# Patient Record
Sex: FEMALE
[De-identification: names, ages, dates, MRNs, and addresses within clinical notes are randomized per-mention and may not be internally consistent; named-entity substitution may affect disease eponyms.]

---

## 2020-05-26 PROBLEM — Z00.00 ENCOUNTER FOR PREVENTIVE HEALTH EXAMINATION: Status: ACTIVE | Noted: 2020-05-26

## 2020-05-27 ENCOUNTER — LABORATORY RESULT (OUTPATIENT)
Age: 65
End: 2020-05-27

## 2020-05-27 ENCOUNTER — APPOINTMENT (OUTPATIENT)
Dept: CARDIOLOGY | Facility: CLINIC | Age: 65
End: 2020-05-27
Payer: MEDICARE

## 2020-05-27 ENCOUNTER — NON-APPOINTMENT (OUTPATIENT)
Age: 65
End: 2020-05-27

## 2020-05-27 VITALS
BODY MASS INDEX: 31.65 KG/M2 | WEIGHT: 190 LBS | DIASTOLIC BLOOD PRESSURE: 85 MMHG | SYSTOLIC BLOOD PRESSURE: 136 MMHG | RESPIRATION RATE: 15 BRPM | HEART RATE: 94 BPM | HEIGHT: 65 IN

## 2020-05-27 PROCEDURE — 99204 OFFICE O/P NEW MOD 45 MIN: CPT

## 2020-05-27 PROCEDURE — 93000 ELECTROCARDIOGRAM COMPLETE: CPT

## 2020-05-27 NOTE — REASON FOR VISIT
[Consultation] : a consultation regarding [Chest Pain] : chest pain [Palpitations] : palpitations [FreeTextEntry1] : murmur

## 2020-05-27 NOTE — DISCUSSION/SUMMARY
[FreeTextEntry1] : This is a 65-year-old female with past medical history significant for status post partial thyroidectomy, (goiter), status post appendectomy, status post 2  sections, who comes in for cardiac consultation.  The patient has been complaining of a one-year history of episodic chest pressure.  She also complains of occasional palpitations best described as a rapid heartbeat usually when she has a lot of stress.\par She was born in [facility] and has no history of rheumatic fever.  She does not drink excessive caffeine or alcohol.  She lost her  9 years ago to liver cancer (had a history of hepatitis).  Her brother had a heart condition and ultimately  of sepsis.  She was never a smoker.  Her blood pressure is slightly elevated today but she reports it is usually normal.\par Electrocardiogram done May 27, 2020 demonstrated normal sinus rhythm at a rate of 99 bpm is otherwise remarkable for left atrial abnormality.  There are T wave inversions in V1 and V2 which could represent juvenile T wave pattern.\par The patient is also concerned about COVID-19.  She traveled from [facility] through [place] back to the [place] 2020.  She will have blood work today for COVID-19 antibodies.  I think her palpitations currently are related to anxiety.\par The patient will schedule exercise stress test to rule out significant coronary artery disease.\par She will schedule an echo Doppler examination to evaluate her murmur, palpitations, evaluate her left ventricular function, chamber size, murmur, and rule out hypertrophy.\par She is encouraged to use Pepcid AC max as needed and maintain good hydration.\par \par She will follow-up with me after above-noted diagnostic tests are completed.  She will follow-up with her medical doctor as well.  Complete blood work was done today for lipid panel and SMA-20.\par The patient understands that aerobic exercises must be increased to 40 minutes 4 times per week. A detailed discussion of lifestyle modification was done today. The patient has a good understanding of the diagnosis, and treatment plan. Lifestyle modification was also outlined.

## 2020-05-27 NOTE — PHYSICAL EXAM
[Eyelids - No Xanthelasma] : the eyelids demonstrated no xanthelasmas [5th Left ICS - MCL] : palpated at the 5th LICS in the midclavicular line [I] : a grade 1 [No Xanthoma] : no  xanthoma was observed [General Appearance - Well Developed] : well developed [Normal Appearance] : normal appearance [Well Groomed] : well groomed [No Deformities] : no deformities [General Appearance - In No Acute Distress] : no acute distress [General Appearance - Well Nourished] : well nourished [Normal Conjunctiva] : the conjunctiva exhibited no abnormalities [Normal Oral Mucosa] : normal oral mucosa [Normal Oropharynx] : normal oropharynx [Normal Jugular Venous A Waves Present] : normal jugular venous A waves present [Normal Jugular Venous V Waves Present] : normal jugular venous V waves present [No Jugular Venous Oquendo A Waves] : no jugular venous oquendo A waves [Normal] : normal [No Precordial Heave] : no precordial heave was noted [Normal Rate] : normal [Rhythm Regular] : regular [Normal S1] : normal S1 [Normal S2] : normal S2 [No Gallop] : no gallop heard [II] : a grade 2 [2+] : right 2+ [No Pitting Edema] : no pitting edema present [No Abnormalities] : the abdominal aorta was not enlarged and no bruit was heard [Respiration, Rhythm And Depth] : normal respiratory rhythm and effort [Exaggerated Use Of Accessory Muscles For Inspiration] : no accessory muscle use [Auscultation Breath Sounds / Voice Sounds] : lungs were clear to auscultation bilaterally [Bowel Sounds] : normal bowel sounds [Abdomen Soft] : soft [Gait - Sufficient For Exercise Testing] : the gait was sufficient for exercise testing [Abnormal Walk] : normal gait [Nail Clubbing] : no clubbing of the fingernails [Cyanosis, Localized] : no localized cyanosis [Skin Color & Pigmentation] : normal skin color and pigmentation [Skin Turgor] : normal skin turgor [Oriented To Time, Place, And Person] : oriented to person, place, and time [] : no rash [Affect] : the affect was normal [No Anxiety] : not feeling anxious [Mood] : the mood was normal [Click] : no click [S4] : no S4 [S3] : no S3 [Distant] : the heart sounds were ~L not distant [Pericardial Rub] : no pericardial rub [Right Carotid Bruit] : no bruit heard over the right carotid [Left Carotid Bruit] : no bruit heard over the left carotid [Left Femoral Bruit] : no bruit heard over the left femoral artery [Right Femoral Bruit] : no bruit heard over the right femoral artery

## 2020-05-27 NOTE — DISCUSSION/SUMMARY
[FreeTextEntry1] : This is a 65-year-old female with past medical history significant for status post partial thyroidectomy, (goiter), status post appendectomy, status post 2  sections, who comes in for cardiac consultation.  The patient has been complaining of a one-year history of episodic chest pressure.  She also complains of occasional palpitations best described as a rapid heartbeat usually when she has a lot of stress.\par She was born in [facility] and has no history of rheumatic fever.  She does not drink excessive caffeine or alcohol.  She lost her  9 years ago to liver cancer (had a history of hepatitis).  Her brother had a heart condition and ultimately  of sepsis.  She was never a smoker.  Her blood pressure is slightly elevated today but she reports it is usually normal.\par Electrocardiogram done May 27, 2020 demonstrated normal sinus rhythm at a rate of 99 bpm is otherwise remarkable for left atrial abnormality.  There are T wave inversions in V1 and V2 which could represent juvenile T wave pattern.\par The patient is also concerned about COVID-19.  She traveled from [facility] through [place] back to the [place] 2020.  She will have blood work today for COVID-19 antibodies.  I think her palpitations currently are related to anxiety.\par The patient will schedule exercise stress test to rule out significant coronary artery disease.\par She will schedule an echo Doppler examination to evaluate her murmur, palpitations, evaluate her left ventricular function, chamber size, murmur, and rule out hypertrophy.\par She is encouraged to use Pepcid AC max as needed and maintain good hydration.\par \par She will follow-up with me after above-noted diagnostic tests are completed.  She will follow-up with her medical doctor as well.  Complete blood work was done today for lipid panel and SMA-20.\par The patient understands that aerobic exercises must be increased to 40 minutes 4 times per week. A detailed discussion of lifestyle modification was done today. The patient has a good understanding of the diagnosis, and treatment plan. Lifestyle modification was also outlined.

## 2020-05-27 NOTE — PHYSICAL EXAM
[Eyelids - No Xanthelasma] : the eyelids demonstrated no xanthelasmas [5th Left ICS - MCL] : palpated at the 5th LICS in the midclavicular line [I] : a grade 1 [No Xanthoma] : no  xanthoma was observed [General Appearance - Well Developed] : well developed [Well Groomed] : well groomed [Normal Appearance] : normal appearance [General Appearance - Well Nourished] : well nourished [No Deformities] : no deformities [General Appearance - In No Acute Distress] : no acute distress [Normal Oral Mucosa] : normal oral mucosa [Normal Conjunctiva] : the conjunctiva exhibited no abnormalities [Normal Jugular Venous A Waves Present] : normal jugular venous A waves present [Normal Oropharynx] : normal oropharynx [Normal Jugular Venous V Waves Present] : normal jugular venous V waves present [No Jugular Venous Oquendo A Waves] : no jugular venous oquendo A waves [No Precordial Heave] : no precordial heave was noted [Normal] : normal [Rhythm Regular] : regular [Normal S1] : normal S1 [Normal Rate] : normal [No Gallop] : no gallop heard [Normal S2] : normal S2 [II] : a grade 2 [2+] : right 2+ [No Pitting Edema] : no pitting edema present [No Abnormalities] : the abdominal aorta was not enlarged and no bruit was heard [Respiration, Rhythm And Depth] : normal respiratory rhythm and effort [Auscultation Breath Sounds / Voice Sounds] : lungs were clear to auscultation bilaterally [Exaggerated Use Of Accessory Muscles For Inspiration] : no accessory muscle use [Bowel Sounds] : normal bowel sounds [Abdomen Soft] : soft [Abnormal Walk] : normal gait [Nail Clubbing] : no clubbing of the fingernails [Gait - Sufficient For Exercise Testing] : the gait was sufficient for exercise testing [Skin Color & Pigmentation] : normal skin color and pigmentation [Cyanosis, Localized] : no localized cyanosis [Skin Turgor] : normal skin turgor [Oriented To Time, Place, And Person] : oriented to person, place, and time [] : no rash [Affect] : the affect was normal [Mood] : the mood was normal [No Anxiety] : not feeling anxious [S4] : no S4 [Click] : no click [S3] : no S3 [Distant] : the heart sounds were ~L not distant [Pericardial Rub] : no pericardial rub [Left Carotid Bruit] : no bruit heard over the left carotid [Right Carotid Bruit] : no bruit heard over the right carotid [Left Femoral Bruit] : no bruit heard over the left femoral artery [Right Femoral Bruit] : no bruit heard over the right femoral artery

## 2020-06-09 ENCOUNTER — APPOINTMENT (OUTPATIENT)
Dept: CARDIOLOGY | Facility: CLINIC | Age: 65
End: 2020-06-09

## 2020-08-26 ENCOUNTER — RESULT REVIEW (OUTPATIENT)
Age: 65
End: 2020-08-26

## 2021-03-01 ENCOUNTER — APPOINTMENT (OUTPATIENT)
Dept: CARDIOLOGY | Facility: CLINIC | Age: 66
End: 2021-03-01
Payer: MEDICARE

## 2021-03-01 ENCOUNTER — NON-APPOINTMENT (OUTPATIENT)
Age: 66
End: 2021-03-01

## 2021-03-01 VITALS
WEIGHT: 200 LBS | DIASTOLIC BLOOD PRESSURE: 90 MMHG | HEIGHT: 65 IN | HEART RATE: 85 BPM | SYSTOLIC BLOOD PRESSURE: 134 MMHG | BODY MASS INDEX: 33.32 KG/M2 | RESPIRATION RATE: 16 BRPM

## 2021-03-01 PROCEDURE — 99072 ADDL SUPL MATRL&STAF TM PHE: CPT

## 2021-03-01 PROCEDURE — 93000 ELECTROCARDIOGRAM COMPLETE: CPT | Mod: 59

## 2021-03-01 PROCEDURE — 93224 XTRNL ECG REC UP TO 48 HRS: CPT

## 2021-03-01 PROCEDURE — 99214 OFFICE O/P EST MOD 30 MIN: CPT

## 2021-03-01 NOTE — PHYSICAL EXAM
[General Appearance - Well Developed] : well developed [Normal Appearance] : normal appearance [Well Groomed] : well groomed [General Appearance - Well Nourished] : well nourished [No Deformities] : no deformities [General Appearance - In No Acute Distress] : no acute distress [Normal Conjunctiva] : the conjunctiva exhibited no abnormalities [Normal Oral Mucosa] : normal oral mucosa [Normal Oropharynx] : normal oropharynx [Normal Jugular Venous A Waves Present] : normal jugular venous A waves present [Normal Jugular Venous V Waves Present] : normal jugular venous V waves present [No Jugular Venous Oquendo A Waves] : no jugular venous oquendo A waves [Respiration, Rhythm And Depth] : normal respiratory rhythm and effort [Exaggerated Use Of Accessory Muscles For Inspiration] : no accessory muscle use [Auscultation Breath Sounds / Voice Sounds] : lungs were clear to auscultation bilaterally [Bowel Sounds] : normal bowel sounds [Abdomen Soft] : soft [Abnormal Walk] : normal gait [Gait - Sufficient For Exercise Testing] : the gait was sufficient for exercise testing [Nail Clubbing] : no clubbing of the fingernails [Cyanosis, Localized] : no localized cyanosis [Skin Color & Pigmentation] : normal skin color and pigmentation [Skin Turgor] : normal skin turgor [] : no rash [Oriented To Time, Place, And Person] : oriented to person, place, and time [Affect] : the affect was normal [Mood] : the mood was normal [No Anxiety] : not feeling anxious [5th Left ICS - MCL] : palpated at the 5th LICS in the midclavicular line [Normal] : normal [No Precordial Heave] : no precordial heave was noted [Normal Rate] : normal [Rhythm Regular] : regular [Normal S1] : normal S1 [Normal S2] : normal S2 [No Gallop] : no gallop heard [II] : a grade 2 [2+] : left 2+ [No Abnormalities] : the abdominal aorta was not enlarged and no bruit was heard [No Pitting Edema] : no pitting edema present [S3] : no S3 [S4] : no S4 [Click] : no click [Pericardial Rub] : no pericardial rub [Right Carotid Bruit] : no bruit heard over the right carotid [Left Carotid Bruit] : no bruit heard over the left carotid [Right Femoral Bruit] : no bruit heard over the right femoral artery [Left Femoral Bruit] : no bruit heard over the left femoral artery

## 2021-03-01 NOTE — ASSESSMENT
[FreeTextEntry1] : This is a 65 year year old female here today for follow up cardiac evaluation. \par She has a past medical history significant for status post partial thyroidectomy, (goiter), status post appendectomy, status post 2  sections and palpitations.\par \par Today she is stating that she is having occasional LOFTON and chest pain when she feels that she is having an "anxiety attack". She states that she will feel her heart racing at that when she puts ice cubes on her neck she would feel a little better. She states that she has been under a lot of stress lately with the stress of COVID and not being able to visit her home in [place]. She also lives with her elderly mother and is worried about her health. She states that when she feels nervous her panic attacks can last for up to 1 hour and can happen daily. She did take 1/2 a xanax last night when made her feel better but she states that she was not able to sleep last night. She was last seen here in May 2020.\par \par BLOOD PRESSURE:\par -BP is borderline elevated on today's exam and she states that she does not want to start BP medication at this time. I explained that we will recheck her BP in 1 month and if it remains elevated I would consider starting Telmisartan 40mg PO DAILY. \par \par -I have discussed the importance of maintaining good BP control and reviewed the newest guidelines with the patient while re-enforcing dietary sodium restrictions to no more than 2-3 g daily, DASH diet, life style modifications as well as the goal of maintaining ideal body weight with the patient today. I have advised the patient to avoid the use of over-the-counter medications/ supplements especially NSAIDS.\par \par BLOOD WORK:\par -New blood work was done today, 2021 to evaluate lipid profile, CBC, BMP, hepatic function, A1C and TSH. Pt currently taking Rosuvastatin 20mg PO DAILY.\par \par CHOLESTEROL CONTROL:\par -Patient will continue the advised  TLC diet and to continue follow-up for treatment of hyperlipidemia and repeat blood testing with diet and exercise. I have discussed different exercises and the importance of maintenance of optimal body weight. The importance of staying within guidelines and recommendations was stressed to the patient today and they acknowledged that they understand this to me verbally.\par \par  -Ms. GILL was educated and advised that failure to follow-up with my medical recommendations to lower cholesterol can result in severe health consequences therefore, they will continuing a low saturated and low fat diet and to avoid excessive carbohydrates to help reduce triglycerides and that lowering LDL levels is associated with a significant decrease in serious cardiac events including but not limited to heart attack stroke and overall death. We will continue lipid lowering agents as advised based on blood test results and the patient understands to call if they develop severe muscle discomfort or if they have a reddish tinted discoloration in their urine.\par \par TESTING/REPORTS:\par -EKG done Mar 01, 2021 which demonstrated regular sinus rhythm with nonspecific ST-T wave changes, BPM of 85.\par \par PLAN:\par -The patient will schedule an Exercise Stress Test rule out significant coronary artery disease and will schedule \par an Echo Doppler examination to evaluate murmur, left ventricular function, chamber size, and rule out hypertrophy.\par -We will order 24 hour holter monitor to evaluate her palpitations. \par -She will stay well hydrated. \par -I will provide her with a PCP Dr. Phelan since she does not have one right now. \par -We will reassess her BP and if it remains elevated then we will discuss the addition of Telmisartan 40mg PO DAILY.\par \par I have discussed the plan of care with Ms. ZAN GILL  and she  will follow up in 1 month. She is compliant with all of her medications.\par \par The patient understands that aerobic exercises must be increased to minutes 4 times/week and a detailed discussion of lifestyle modification was done today. \par The patient has a good understanding of the diagnosis, treatment plan and lifestyle modification. \par She will contact me at the office for any questions with their care or any changes in their health status.\par \par The plan of care was discussed with supervising physician, Dr. Morse while present in the office at the time of the visit. \par \par Raoul WEBB

## 2021-03-01 NOTE — DISCUSSION/SUMMARY
[FreeTextEntry1] : Dr. Morse-(PRIOR VISIT and PMH WITH Dr. Morse): \par This is a 65-year-old female with past medical history significant for status post partial thyroidectomy, (goiter), status post appendectomy, status post 2  sections, who comes in for cardiac consultation.  The patient has been complaining of a one-year history of episodic chest pressure.  She also complains of occasional palpitations best described as a rapid heartbeat usually when she has a lot of stress.\par She was born in [facility] and has no history of rheumatic fever.  She does not drink excessive caffeine or alcohol.  She lost her  9 years ago to liver cancer (had a history of hepatitis).  Her brother had a heart condition and ultimately  of sepsis.  She was never a smoker.  Her blood pressure is slightly elevated today but she reports it is usually normal.\par Electrocardiogram done May 27, 2020 demonstrated normal sinus rhythm at a rate of 99 bpm is otherwise remarkable for left atrial abnormality.  There are T wave inversions in V1 and V2 which could represent juvenile T wave pattern.\par The patient is also concerned about COVID-19.  She traveled from [facility] through [place] back [place] the [place] 2020.  She will have blood work today for COVID-19 antibodies.  I think her palpitations currently are related to anxiety.\par The patient will schedule exercise stress test to rule out significant coronary artery disease.\par She will schedule an echo Doppler examination to evaluate her murmur, palpitations, evaluate her left ventricular function, chamber size, murmur, and rule out hypertrophy.\par She is encouraged to use Pepcid AC max as needed and maintain good hydration.\par \par She will follow-up with me after above-noted diagnostic tests are completed.  She will follow-up with her medical doctor as well.  Complete blood work was done today for lipid panel and SMA-20.\par The patient understands that aerobic exercises must be increased to 40 minutes 4 times per week. A detailed discussion of lifestyle modification was done today. The patient has a good understanding of the diagnosis, and treatment plan. Lifestyle modification was also outlined.

## 2021-03-01 NOTE — REASON FOR VISIT
[Follow-Up - Clinic] : a clinic follow-up of [Chest Pain] : chest pain [Hyperlipidemia] : hyperlipidemia [Palpitations] : palpitations [Hypertension] : hypertension [FreeTextEntry1] : This is a 65 year year old female here today for follow up cardiac evaluation. \par She has a past medical history significant for status post partial thyroidectomy, (goiter), status post appendectomy, status post 2  sections and palpitations.\par \par Today she is stating that she is having occasional LOFTON and chest pain when she feels that she is having an "anxiety attack". She states that she will feel her heart racing at that when she puts ice cubes on her neck she would feel a little better. She states that she has been under a lot of stress lately with the stress of COVID and not being able to visit her home in [place]. She also lives with her elderly mother and is worried about her health. She states that when she feels nervous her panic attacks can last for up to 1 hour and can happen daily. She did take 1/2 a xanax last night when made her feel better but she states that she was not able to sleep last night. She was last seen here in May 2020.\par \par

## 2021-03-09 ENCOUNTER — NON-APPOINTMENT (OUTPATIENT)
Age: 66
End: 2021-03-09

## 2021-03-16 ENCOUNTER — TRANSCRIPTION ENCOUNTER (OUTPATIENT)
Age: 66
End: 2021-03-16

## 2021-03-19 ENCOUNTER — NON-APPOINTMENT (OUTPATIENT)
Age: 66
End: 2021-03-19

## 2021-03-23 ENCOUNTER — NON-APPOINTMENT (OUTPATIENT)
Age: 66
End: 2021-03-23

## 2021-04-29 ENCOUNTER — NON-APPOINTMENT (OUTPATIENT)
Age: 66
End: 2021-04-29

## 2021-04-30 ENCOUNTER — APPOINTMENT (OUTPATIENT)
Dept: CARDIOLOGY | Facility: CLINIC | Age: 66
End: 2021-04-30
Payer: MEDICARE

## 2021-04-30 ENCOUNTER — NON-APPOINTMENT (OUTPATIENT)
Age: 66
End: 2021-04-30

## 2021-04-30 VITALS
OXYGEN SATURATION: 98 % | SYSTOLIC BLOOD PRESSURE: 138 MMHG | BODY MASS INDEX: 27.32 KG/M2 | TEMPERATURE: 97.8 F | WEIGHT: 164 LBS | DIASTOLIC BLOOD PRESSURE: 84 MMHG | RESPIRATION RATE: 16 BRPM | HEART RATE: 97 BPM | HEIGHT: 65 IN

## 2021-04-30 DIAGNOSIS — Z87.898 PERSONAL HISTORY OF OTHER SPECIFIED CONDITIONS: ICD-10-CM

## 2021-04-30 PROCEDURE — 93246 EXT ECG>7D<15D RECORDING: CPT

## 2021-04-30 PROCEDURE — 93000 ELECTROCARDIOGRAM COMPLETE: CPT | Mod: 59

## 2021-04-30 PROCEDURE — 99214 OFFICE O/P EST MOD 30 MIN: CPT

## 2021-04-30 PROCEDURE — 99072 ADDL SUPL MATRL&STAF TM PHE: CPT

## 2021-04-30 NOTE — PHYSICAL EXAM
[Well Nourished] : well nourished [Well Developed] : well developed [No Acute Distress] : no acute distress [Normal Venous Pressure] : normal venous pressure [No Carotid Bruit] : no carotid bruit [Normal S1, S2] : normal S1, S2 [No Murmur] : no murmur [No Rub] : no rub [Clear Lung Fields] : clear lung fields [Good Air Entry] : good air entry [No Respiratory Distress] : no respiratory distress  [Soft] : abdomen soft [Non Tender] : non-tender [No Masses/organomegaly] : no masses/organomegaly [Normal Bowel Sounds] : normal bowel sounds [Normal Gait] : normal gait [No Edema] : no edema [No Cyanosis] : no cyanosis [No Clubbing] : no clubbing [No Varicosities] : no varicosities [No Rash] : no rash [No Skin Lesions] : no skin lesions [Moves all extremities] : moves all extremities [No Focal Deficits] : no focal deficits [Normal Speech] : normal speech [Alert and Oriented] : alert and oriented [Normal memory] : normal memory [General Appearance - Well Developed] : well developed [Normal Appearance] : normal appearance [Well Groomed] : well groomed [General Appearance - Well Nourished] : well nourished [No Deformities] : no deformities [General Appearance - In No Acute Distress] : no acute distress [Normal Conjunctiva] : the conjunctiva exhibited no abnormalities [Normal Oral Mucosa] : normal oral mucosa [Normal Oropharynx] : normal oropharynx [Normal Jugular Venous A Waves Present] : normal jugular venous A waves present [Normal Jugular Venous V Waves Present] : normal jugular venous V waves present [No Jugular Venous Oquendo A Waves] : no jugular venous oqeundo A waves [Respiration, Rhythm And Depth] : normal respiratory rhythm and effort [Exaggerated Use Of Accessory Muscles For Inspiration] : no accessory muscle use [Auscultation Breath Sounds / Voice Sounds] : lungs were clear to auscultation bilaterally [Bowel Sounds] : normal bowel sounds [Abdomen Soft] : soft [Abnormal Walk] : normal gait [Gait - Sufficient For Exercise Testing] : the gait was sufficient for exercise testing [Nail Clubbing] : no clubbing of the fingernails [Cyanosis, Localized] : no localized cyanosis [Skin Color & Pigmentation] : normal skin color and pigmentation [Skin Turgor] : normal skin turgor [] : no rash [Oriented To Time, Place, And Person] : oriented to person, place, and time [Affect] : the affect was normal [Mood] : the mood was normal [No Anxiety] : not feeling anxious [5th Left ICS - MCL] : palpated at the 5th LICS in the midclavicular line [Normal] : normal [No Precordial Heave] : no precordial heave was noted [Normal Rate] : normal [Rhythm Regular] : regular [Normal S1] : normal S1 [Normal S2] : normal S2 [No Gallop] : no gallop heard [II] : a grade 2 [2+] : left 2+ [No Abnormalities] : the abdominal aorta was not enlarged and no bruit was heard [No Pitting Edema] : no pitting edema present [S3] : no S3 [S4] : no S4 [Click] : no click [Pericardial Rub] : no pericardial rub [Right Carotid Bruit] : no bruit heard over the right carotid [Left Carotid Bruit] : no bruit heard over the left carotid [Right Femoral Bruit] : no bruit heard over the right femoral artery [Left Femoral Bruit] : no bruit heard over the left femoral artery

## 2021-04-30 NOTE — ASSESSMENT
[FreeTextEntry1] : This is a 65 year year old female here today for follow up cardiac evaluation. \par She has a past medical history significant for status post partial thyroidectomy, (goiter), status post appendectomy, status post 2  sections and palpitations.\par \par She is here today because she experienced heart palpitations yesterday while she was out shopping for [place] Easter which is this weekend. She does have a hx of feeling like this before and had a 24 hour holter monitor which was placed in 2021 which demonstrated NSR avg HR 84. She states that the palpitations lasted for about 1-2 hours and she put cold water on her neck and took 0.5 tab of a Xanax which did help her palpitations. When the episode occurred she did feel some chest pain/pressures with some LOFTON. She thinks she may be having anxiety attacks but would like to make sure it is not her heart since she  does have FM of heart disease with her father. \par \par BLOOD PRESSURE:\par -BP is borderline elevated on today's exam and she states that she does not want to start BP medication at this time. I explained that we will recheck her BP in 1 month and if it remains elevated I would consider starting Telmisartan 40mg PO DAILY.  She states that she has been very stressed and at home her BP is usually better.\par \par -I have discussed the importance of maintaining good BP control and reviewed the newest guidelines with the patient while re-enforcing dietary sodium restrictions to no more than 2-3 g daily, DASH diet, life style modifications as well as the goal of maintaining ideal body weight with the patient today. I have advised the patient to avoid the use of over-the-counter medications/ supplements especially NSAIDS.\par \par BLOOD WORK:\par -New blood work was done 2021 which demonstrated LDL of 101. Pt currently taking Rosuvastatin 20mg PO DAILY. TSH and CBC were normal. \par \par CHOLESTEROL CONTROL:\par -Patient will continue the advised  TLC diet and to continue follow-up for treatment of hyperlipidemia and repeat blood testing with diet and exercise. I have discussed different exercises and the importance of maintenance of optimal body weight. The importance of staying within guidelines and recommendations was stressed to the patient today and they acknowledged that they understand this to me verbally.\par \par  -Ms. GILL was educated and advised that failure to follow-up with my medical recommendations to lower cholesterol can result in severe health consequences therefore, they will continuing a low saturated and low fat diet and to avoid excessive carbohydrates to help reduce triglycerides and that lowering LDL levels is associated with a significant decrease in serious cardiac events including but not limited to heart attack stroke and overall death. We will continue lipid lowering agents as advised based on blood test results and the patient understands to call if they develop severe muscle discomfort or if they have a reddish tinted discoloration in their urine.\par \par TESTING/REPORTS:\par -EKG done 21 which demonstrated regular sinus rhythm with nonspecific ST-T wave changes, BPM of 92.\par \par PLAN:\par -The patient will schedule Nuclear Stress Test rule out significant coronary artery disease and will schedule \par an Echo Doppler examination to evaluate murmur, left ventricular function, chamber size, and rule out hypertrophy.\par \par -She will stay well hydrated. \par -She will follow up with her PCP.\par \par -We will reassess her BP and if it remains elevated then we will discuss the addition of Telmisartan 40mg PO DAILY.\par \par I have discussed the plan of care with Ms. ZAN GILL  and she  will follow up in 1 month. She is compliant with all of her medications.\par \par The patient understands that aerobic exercises must be increased to minutes 4 times/week and a detailed discussion of lifestyle modification was done today. \par The patient has a good understanding of the diagnosis, treatment plan and lifestyle modification. \par She will contact me at the office for any questions with their care or any changes in their health status.\par \par The plan of care was discussed with supervising physician, Dr. Morse while present in the office at the time of the visit. \par \par Raoul WEBB

## 2021-04-30 NOTE — REASON FOR VISIT
[Follow-Up - Clinic] : a clinic follow-up of [Hyperlipidemia] : hyperlipidemia [Chest Pain] : chest pain [Hypertension] : hypertension [Palpitations] : palpitations [FreeTextEntry1] : murmur

## 2021-04-30 NOTE — DISCUSSION/SUMMARY
[FreeTextEntry1] : Dr. Morse-(PRIOR VISIT and PMH WITH Dr. Morse): \par This is a 65-year-old female with past medical history significant for status post partial thyroidectomy, (goiter), status post appendectomy, status post 2  sections, who comes in for cardiac consultation.  The patient has been complaining of a one-year history of episodic chest pressure.  She also complains of occasional palpitations best described as a rapid heartbeat usually when she has a lot of stress.\par She was born in [facility] and has no history of rheumatic fever.  She does not drink excessive caffeine or alcohol.  She lost her  9 years ago to liver cancer (had a history of hepatitis).  Her brother had a heart condition and ultimately  of sepsis.  She was never a smoker.  Her blood pressure is slightly elevated today but she reports it is usually normal.\par Electrocardiogram done May 27, 2020 demonstrated normal sinus rhythm at a rate of 99 bpm is otherwise remarkable for left atrial abnormality.  There are T wave inversions in V1 and V2 which could represent juvenile T wave pattern.\par The patient is also concerned about COVID-19.  She traveled from [facility] through [place] back to the [place] 2020.  She will have blood work today for COVID-19 antibodies.  I think her palpitations currently are related to anxiety.\par The patient will schedule exercise stress test to rule out significant coronary artery disease.\par She will schedule an echo Doppler examination to evaluate her murmur, palpitations, evaluate her left ventricular function, chamber size, murmur, and rule out hypertrophy.\par She is encouraged to use Pepcid AC max as needed and maintain good hydration.\par \par She will follow-up with me after above-noted diagnostic tests are completed.  She will follow-up with her medical doctor as well.  Complete blood work was done today for lipid panel and SMA-20.\par The patient understands that aerobic exercises must be increased to 40 minutes 4 times per week. A detailed discussion of lifestyle modification was done today. The patient has a good understanding of the diagnosis, and treatment plan. Lifestyle modification was also outlined.

## 2021-04-30 NOTE — REVIEW OF SYSTEMS
[Palpitations] : palpitations [Negative] : Heme/Lymph [Dyspnea on exertion] : dyspnea during exertion

## 2021-05-12 ENCOUNTER — APPOINTMENT (OUTPATIENT)
Dept: CARDIOLOGY | Facility: CLINIC | Age: 66
End: 2021-05-12
Payer: MEDICARE

## 2021-05-12 PROCEDURE — 93015 CV STRESS TEST SUPVJ I&R: CPT

## 2021-05-12 PROCEDURE — A9500: CPT

## 2021-05-12 PROCEDURE — 99072 ADDL SUPL MATRL&STAF TM PHE: CPT

## 2021-05-12 PROCEDURE — 78452 HT MUSCLE IMAGE SPECT MULT: CPT

## 2021-05-14 ENCOUNTER — APPOINTMENT (OUTPATIENT)
Dept: FAMILY MEDICINE | Facility: CLINIC | Age: 66
End: 2021-05-14

## 2021-05-19 ENCOUNTER — TRANSCRIPTION ENCOUNTER (OUTPATIENT)
Age: 66
End: 2021-05-19

## 2021-05-20 ENCOUNTER — APPOINTMENT (OUTPATIENT)
Dept: CARDIOLOGY | Facility: CLINIC | Age: 66
End: 2021-05-20
Payer: MEDICARE

## 2021-05-20 PROCEDURE — 93306 TTE W/DOPPLER COMPLETE: CPT

## 2021-05-20 PROCEDURE — 99072 ADDL SUPL MATRL&STAF TM PHE: CPT

## 2021-05-25 ENCOUNTER — APPOINTMENT (OUTPATIENT)
Dept: CARDIOLOGY | Facility: CLINIC | Age: 66
End: 2021-05-25
Payer: MEDICARE

## 2021-05-25 PROCEDURE — 93248 EXT ECG>7D<15D REV&INTERPJ: CPT

## 2021-05-25 PROCEDURE — 99072 ADDL SUPL MATRL&STAF TM PHE: CPT

## 2021-06-06 ENCOUNTER — LABORATORY RESULT (OUTPATIENT)
Age: 66
End: 2021-06-06

## 2021-06-07 ENCOUNTER — APPOINTMENT (OUTPATIENT)
Dept: CARDIOLOGY | Facility: CLINIC | Age: 66
End: 2021-06-07
Payer: MEDICARE

## 2021-06-07 ENCOUNTER — NON-APPOINTMENT (OUTPATIENT)
Age: 66
End: 2021-06-07

## 2021-06-07 VITALS
WEIGHT: 197 LBS | RESPIRATION RATE: 15 BRPM | DIASTOLIC BLOOD PRESSURE: 83 MMHG | HEART RATE: 100 BPM | HEIGHT: 65 IN | OXYGEN SATURATION: 98 % | SYSTOLIC BLOOD PRESSURE: 127 MMHG | BODY MASS INDEX: 32.82 KG/M2 | TEMPERATURE: 98.1 F

## 2021-06-07 PROCEDURE — 99213 OFFICE O/P EST LOW 20 MIN: CPT

## 2021-06-07 PROCEDURE — 99072 ADDL SUPL MATRL&STAF TM PHE: CPT

## 2021-06-07 PROCEDURE — 93000 ELECTROCARDIOGRAM COMPLETE: CPT

## 2021-06-07 NOTE — ASSESSMENT
[FreeTextEntry1] : Nurse practitioner Mylene note from 2021:\par This is a 66 year year old female here today for follow up cardiac evaluation. \par She has a past medical history significant for status post partial thyroidectomy, (goiter), status post appendectomy, status post 2  sections and palpitations.\par \par She is here today because she experienced heart palpitations yesterday while she was out shopping for [place] [employer] which is this weekend. She does have a hx of feeling like this before and had a 24 hour holter monitor which was placed in 2021 which demonstrated NSR avg HR 84. She states that the palpitations lasted for about 1-2 hours and she put cold water on her neck and took 0.5 tab of a Xanax which did help her palpitations. When the episode occurred she did feel some chest pain/pressures with some LOFTON. She thinks she may be having anxiety attacks but would like to make sure it is not her heart since she  does have FM of heart disease with her father. \par \par BLOOD PRESSURE:\par -BP is borderline elevated on today's exam and she states that she does not want to start BP medication at this time. I explained that we will recheck her BP in 1 month and if it remains elevated I would consider starting Telmisartan 40mg PO DAILY.  She states that she has been very stressed and at home her BP is usually better.\par \par -I have discussed the importance of maintaining good BP control and reviewed the newest guidelines with the patient while re-enforcing dietary sodium restrictions to no more than 2-3 g daily, DASH diet, life style modifications as well as the goal of maintaining ideal body weight with the patient today. I have advised the patient to avoid the use of over-the-counter medications/ supplements especially NSAIDS.\par \par BLOOD WORK:\par -New blood work was done 2021 which demonstrated LDL of 101. Pt currently taking Rosuvastatin 20mg PO DAILY. TSH and CBC were normal. \par \par CHOLESTEROL CONTROL:\par -Patient will continue the advised  TLC diet and to continue follow-up for treatment of hyperlipidemia and repeat blood testing with diet and exercise. I have discussed different exercises and the importance of maintenance of optimal body weight. The importance of staying within guidelines and recommendations was stressed to the patient today and they acknowledged that they understand this to me verbally.\par \par  -Ms. GILL was educated and advised that failure to follow-up with my medical recommendations to lower cholesterol can result in severe health consequences therefore, they will continuing a low saturated and low fat diet and to avoid excessive carbohydrates to help reduce triglycerides and that lowering LDL levels is associated with a significant decrease in serious cardiac events including but not limited to heart attack stroke and overall death. We will continue lipid lowering agents as advised based on blood test results and the patient understands to call if they develop severe muscle discomfort or if they have a reddish tinted discoloration in their urine.\par \par TESTING/REPORTS:\par -EKG done 21 which demonstrated regular sinus rhythm with nonspecific ST-T wave changes, BPM of 92.\par \par PLAN:\par -The patient will schedule Nuclear Stress Test rule out significant coronary artery disease and will schedule \par an Echo Doppler examination to evaluate murmur, left ventricular function, chamber size, and rule out hypertrophy.\par \par -She will stay well hydrated. \par -She will follow up with her PCP.\par \par -We will reassess her BP and if it remains elevated then we will discuss the addition of Telmisartan 40mg PO DAILY.\par \par I have discussed the plan of care with Ms. ZAN GILL  and she  will follow up in 1 month. She is compliant with all of her medications.\par \par The patient understands that aerobic exercises must be increased to minutes 4 times/week and a detailed discussion of lifestyle modification was done today. \par The patient has a good understanding of the diagnosis, treatment plan and lifestyle modification. \par She will contact me at the office for any questions with their care or any changes in their health status.\par \par The plan of care was discussed with supervising physician, Dr. Morse while present in the office at the time of the visit. \par \par Raoul WEBB

## 2021-06-07 NOTE — PHYSICAL EXAM
[Well Developed] : well developed [Well Nourished] : well nourished [No Acute Distress] : no acute distress [Normal Venous Pressure] : normal venous pressure [No Carotid Bruit] : no carotid bruit [Normal S1, S2] : normal S1, S2 [No Murmur] : no murmur [No Rub] : no rub [Clear Lung Fields] : clear lung fields [Good Air Entry] : good air entry [No Respiratory Distress] : no respiratory distress  [Soft] : abdomen soft [Non Tender] : non-tender [No Masses/organomegaly] : no masses/organomegaly [Normal Bowel Sounds] : normal bowel sounds [Normal Gait] : normal gait [No Edema] : no edema [No Cyanosis] : no cyanosis [No Clubbing] : no clubbing [No Varicosities] : no varicosities [No Rash] : no rash [No Skin Lesions] : no skin lesions [Moves all extremities] : moves all extremities [No Focal Deficits] : no focal deficits [Normal Speech] : normal speech [Alert and Oriented] : alert and oriented [Normal memory] : normal memory [General Appearance - Well Developed] : well developed [Normal Appearance] : normal appearance [Well Groomed] : well groomed [General Appearance - Well Nourished] : well nourished [No Deformities] : no deformities [General Appearance - In No Acute Distress] : no acute distress [Normal Conjunctiva] : the conjunctiva exhibited no abnormalities [Normal Oral Mucosa] : normal oral mucosa [Normal Oropharynx] : normal oropharynx [Normal Jugular Venous A Waves Present] : normal jugular venous A waves present [Normal Jugular Venous V Waves Present] : normal jugular venous V waves present [No Jugular Venous Oquendo A Waves] : no jugular venous oquendo A waves [Respiration, Rhythm And Depth] : normal respiratory rhythm and effort [Exaggerated Use Of Accessory Muscles For Inspiration] : no accessory muscle use [Auscultation Breath Sounds / Voice Sounds] : lungs were clear to auscultation bilaterally [Bowel Sounds] : normal bowel sounds [Abdomen Soft] : soft [Abnormal Walk] : normal gait [Gait - Sufficient For Exercise Testing] : the gait was sufficient for exercise testing [Nail Clubbing] : no clubbing of the fingernails [Cyanosis, Localized] : no localized cyanosis [Skin Color & Pigmentation] : normal skin color and pigmentation [Skin Turgor] : normal skin turgor [] : no rash [Oriented To Time, Place, And Person] : oriented to person, place, and time [Affect] : the affect was normal [Mood] : the mood was normal [No Anxiety] : not feeling anxious [5th Left ICS - MCL] : palpated at the 5th LICS in the midclavicular line [Normal] : normal [No Precordial Heave] : no precordial heave was noted [Normal Rate] : normal [Rhythm Regular] : regular [Normal S1] : normal S1 [Normal S2] : normal S2 [No Gallop] : no gallop heard [II] : a grade 2 [2+] : left 2+ [No Abnormalities] : the abdominal aorta was not enlarged and no bruit was heard [No Pitting Edema] : no pitting edema present [S3] : no S3 [S4] : no S4 [Click] : no click [Pericardial Rub] : no pericardial rub [Right Carotid Bruit] : no bruit heard over the right carotid [Left Carotid Bruit] : no bruit heard over the left carotid [Right Femoral Bruit] : no bruit heard over the right femoral artery [Left Femoral Bruit] : no bruit heard over the left femoral artery

## 2021-06-07 NOTE — REASON FOR VISIT
[Follow-Up - Clinic] : a clinic follow-up of [Chest Pain] : chest pain [Hyperlipidemia] : hyperlipidemia [Hypertension] : hypertension [Palpitations] : palpitations [CV Risk Factors and Non-Cardiac Disease] : CV risk factors and non-cardiac disease [FreeTextEntry1] : murmur

## 2021-06-07 NOTE — DISCUSSION/SUMMARY
[FreeTextEntry1] : This is a 66-year-old female with past medical history significant for status post partial thyroidectomy, (goiter), status post appendectomy, status post 2  sections, who comes in for cardiac consultation.  The patient has been complaining of a one-year history of episodic chest pressure.  She also complains of occasional palpitations best described as a rapid heartbeat usually when she has a lot of stress.\par The patient had a normal nuclear stress test May 12, 2021.\par Echo Doppler examination done May 20, 2021 demonstrated mild mitral and tricuspid valve regurgitation with borderline concentric left ventricular hypertrophy, and satisfactory left ventricular function with an estimated ejection fraction 65%.\par Electrocardiogram done 2021 demonstrated normal sinus rhythm rate of 90 bpm is otherwise remarkable for left atrial abnormality\par The patient still has a degree of anxiety which is contributing to her symptoms.\par She had blood work done 2021 which demonstrated a total cholesterol 193, triglycerides of 180, HDL of 60, and LDL direct of 101 mg/dL with a hemoglobin A1c of 5.4.\par The patient remains on Crestor 20 mg/day.  She reports that she was talking to some friends who told her that she should be on the 10 mg dose of Crestor given her "good diet".\par Have explained to the patient that she will get a 50% reduction in her LDL cholesterol with Crestor 20 mg/day and may be a 30 to 40% reduction on Crestor 10 mg/day.\par She will have new blood work done today for lipid panel, and SMA-20.  Further recommendations if necessary can be made at that time.  She will continue on her current diet and exercise program.\par \par EKG done 2021 demonstrate normal sinus rhythm rate 92 bpm is otherwise remarkable for left atrial abnormality and nonspecific ST wave changes.\par She was born in [facility] and has no history of rheumatic fever.  She does not drink excessive caffeine or alcohol.  She lost her  9 years ago to liver cancer (had a history of hepatitis).  Her brother had a heart condition and ultimately  of sepsis.  She was never a smoker.  Her blood pressure is slightly elevated today but she reports it is usually normal.\par Electrocardiogram done May 27, 2020 demonstrated normal sinus rhythm at a rate of 99 bpm is otherwise remarkable for left atrial abnormality.  There are T wave inversions in V1 and V2 which could represent juvenile T wave pattern.\par The patient is also concerned about COVID-19.  She traveled from [place] through [place] back [place] the [place] 2020.  She will have blood work today for COVID-19 antibodies.  I think her palpitations currently are related to anxiety.\par I think she would benefit from speaking with someone regarding her anxiety.  I have asked her to return to her primary care physician for recommendation.\par The patient understands that aerobic exercises must be increased to 40 minutes 4 times per week. A detailed discussion of lifestyle modification was done today. The patient has a good understanding of the diagnosis, and treatment plan. Lifestyle modification was also outlined.

## 2021-06-08 ENCOUNTER — TRANSCRIPTION ENCOUNTER (OUTPATIENT)
Age: 66
End: 2021-06-08

## 2021-06-09 ENCOUNTER — TRANSCRIPTION ENCOUNTER (OUTPATIENT)
Age: 66
End: 2021-06-09

## 2021-06-15 ENCOUNTER — NON-APPOINTMENT (OUTPATIENT)
Age: 66
End: 2021-06-15

## 2021-11-29 ENCOUNTER — RESULT REVIEW (OUTPATIENT)
Age: 66
End: 2021-11-29

## 2021-12-10 ENCOUNTER — OUTPATIENT (OUTPATIENT)
Dept: OUTPATIENT SERVICES | Facility: HOSPITAL | Age: 66
LOS: 1 days | End: 2021-12-10
Payer: MEDICARE

## 2021-12-10 ENCOUNTER — APPOINTMENT (OUTPATIENT)
Dept: MAMMOGRAPHY | Facility: HOSPITAL | Age: 66
End: 2021-12-10
Payer: MEDICARE

## 2021-12-10 DIAGNOSIS — Z00.8 ENCOUNTER FOR OTHER GENERAL EXAMINATION: ICD-10-CM

## 2021-12-10 PROCEDURE — 77067 SCR MAMMO BI INCL CAD: CPT | Mod: 26

## 2021-12-10 PROCEDURE — 77065 DX MAMMO INCL CAD UNI: CPT

## 2021-12-10 PROCEDURE — 77063 BREAST TOMOSYNTHESIS BI: CPT

## 2021-12-10 PROCEDURE — 77063 BREAST TOMOSYNTHESIS BI: CPT | Mod: 26

## 2021-12-10 PROCEDURE — 77065 DX MAMMO INCL CAD UNI: CPT | Mod: 26,GG,LT

## 2021-12-10 PROCEDURE — 77067 SCR MAMMO BI INCL CAD: CPT

## 2022-06-03 ENCOUNTER — TRANSCRIPTION ENCOUNTER (OUTPATIENT)
Age: 67
End: 2022-06-03

## 2022-06-20 ENCOUNTER — LABORATORY RESULT (OUTPATIENT)
Age: 67
End: 2022-06-20

## 2022-06-20 ENCOUNTER — APPOINTMENT (OUTPATIENT)
Dept: CARDIOLOGY | Facility: CLINIC | Age: 67
End: 2022-06-20
Payer: MEDICARE

## 2022-06-20 ENCOUNTER — NON-APPOINTMENT (OUTPATIENT)
Age: 67
End: 2022-06-20

## 2022-06-20 VITALS
SYSTOLIC BLOOD PRESSURE: 125 MMHG | RESPIRATION RATE: 16 BRPM | TEMPERATURE: 97.4 F | OXYGEN SATURATION: 98 % | BODY MASS INDEX: 33.15 KG/M2 | DIASTOLIC BLOOD PRESSURE: 80 MMHG | HEIGHT: 65 IN | WEIGHT: 199 LBS

## 2022-06-20 PROCEDURE — 93000 ELECTROCARDIOGRAM COMPLETE: CPT

## 2022-06-20 PROCEDURE — 99214 OFFICE O/P EST MOD 30 MIN: CPT | Mod: 25

## 2022-06-20 NOTE — ASSESSMENT
[FreeTextEntry1] : This is a 67 year year old female here today for follow up cardiac evaluation. \par She has a past medical history significant for status post partial thyroidectomy, (goiter), status post appendectomy, status post 2  sections and palpitations. Patient shares that her symptoms of palpitations and chest tightness are more related with stress. \par \par CHIEF COMPLAINT:\par Today she is feeling generally well and does not have any complaints at this time.  She is currently only on rosuvastatin 20 mg p.o. daily for cholesterol management. 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 \par \par BLOOD PRESSURE:\par -BP is controlled on today's exam.\par \par -I have discussed the importance of maintaining good BP control and reviewed the newest guidelines with the patient while re-enforcing dietary sodium restrictions to no more than 2-3 g daily, DASH diet, life style modifications as well as the goal of maintaining ideal body weight with the patient today. I have advised the patient to avoid the use of over-the-counter medications/ supplements especially NSAIDS.\par \par BLOOD WORK:\par -New blood work was done today, 2022  to evaluate lipid profile, CBC, BMP, hepatic function, A1C and TSH. \par -Blood work was done 2021 which demonstrated LDL of 101. Pt currently taking Rosuvastatin 20mg PO DAILY. TSH and CBC were normal. \par \par CHOLESTEROL CONTROL:\par -Patient will continue the advised  TLC diet and to continue follow-up for treatment of hyperlipidemia and repeat blood testing with diet and exercise. I have discussed different exercises and the importance of maintenance of optimal body weight. The importance of staying within guidelines and recommendations was stressed to the patient today and they acknowledged that they understand this to me verbally.\par \par  -Ms. GILL was educated and advised that failure to follow-up with my medical recommendations to lower cholesterol can result in severe health consequences therefore, they will continuing a low saturated and low fat diet and to avoid excessive carbohydrates to help reduce triglycerides and that lowering LDL levels is associated with a significant decrease in serious cardiac events including but not limited to heart attack stroke and overall death. We will continue lipid lowering agents as advised based on blood test results and the patient understands to call if they develop severe muscle discomfort or if they have a reddish tinted discoloration in their urine.\par \par TESTING/REPORTS:\par -EKG done today 2022 which demonstrated regular sinus rhythm with nonspecific ST-T wave changes BPM of 89.\par \par -EKG done 21 which demonstrated regular sinus rhythm with nonspecific ST-T wave changes, BPM of 92.\par \par -EKG done 2021 demonstrate normal sinus rhythm rate 92 bpm is otherwise remarkable for left atrial abnormality and nonspecific ST wave changes.\par \par -Patient had a nuclear stress test done on 2021: There is a small to mild defect in the distal anterior wall that is fixed suggestive of breast attenuation artifact.  The observed defects is no longer significant with prone imaging.\par \par -Patient had a normal echocardiogram done 2021: Mild mitral regurgitation, normal left ventricular systolic function, mild tricuspid regurgitation.\par \par The patient had a normal nuclear stress test May 12, 2021.\par \par Echo Doppler examination done May 20, 2021 demonstrated mild mitral and tricuspid valve regurgitation with borderline concentric left ventricular hypertrophy, and satisfactory left ventricular function with an estimated ejection fraction 65%.\par \par \par \par PLAN:\par -She will continue with her usual medications and will contact the office if she is having any complaints between now and their next follow up appointment.\par -The patient will schedule an Echo Doppler examination to evaluate murmur, left ventricular function, chamber size, and rule out hypertrophy.\par -She will stay well hydrated. \par -She will follow up with her PCP.\par \par I have discussed the plan of care with Ms. ZAN GILL  and she  will follow up in 3 months. She is compliant with all of her medications.\par \par The patient understands that aerobic exercises must be increased to minutes 4 times/week and a detailed discussion of lifestyle modification was done today. \par The patient has a good understanding of the diagnosis, treatment plan and lifestyle modification. \par She will contact me at the office for any questions with their care or any changes in their health status.\par \par The plan of care was discussed with supervising physician, Dr. Morse while present in the office at the time of the visit. \par \par Raoul WEBB

## 2022-06-20 NOTE — DISCUSSION/SUMMARY
[FreeTextEntry1] : Dr. Morse-(PRIOR VISIT and PMH WITH Dr. Morse): \par This is a 66-year-old female with past medical history significant for status post partial thyroidectomy, (goiter), status post appendectomy, status post 2  sections, who comes in for cardiac consultation. \par \par She was born in [facility] and has no history of rheumatic fever.  She does not drink excessive caffeine or alcohol.  She lost her  9 years ago to liver cancer (had a history of hepatitis).  Her brother had a heart condition and ultimately  of sepsis.  She was never a smoker.  \par \par  The patient has been complaining of a one-year history of episodic chest pressure.  She also complains of occasional palpitations best described as a rapid heartbeat usually when she has a lot of stress.\par \par The patient had a normal nuclear stress test May 12, 2021.\par \par Echo Doppler examination done May 20, 2021 demonstrated mild mitral and tricuspid valve regurgitation with borderline concentric left ventricular hypertrophy, and satisfactory left ventricular function with an estimated ejection fraction 65%.\par \par Electrocardiogram done 2021 demonstrated normal sinus rhythm rate of 90 bpm is otherwise remarkable for left atrial abnormality.\par \par The patient still has a degree of anxiety which is contributing to her symptoms.\par \par She had blood work done 2021 which demonstrated a total cholesterol 193, triglycerides of 180, HDL of 60, and LDL direct of 101 mg/dL with a hemoglobin A1c of 5.4.\par The patient remains on Crestor 20 mg/day.  She reports that she was talking to some friends who told her that she should be on the 10 mg dose of Crestor given her "good diet".\par Have explained to the patient that she will get a 50% reduction in her LDL cholesterol with Crestor 20 mg/day and may be a 30 to 40% reduction on Crestor 10 mg/day.\par She will have new blood work done today for lipid panel, and SMA-20.  Further recommendations if necessary can be made at that time.  She will continue on her current diet and exercise program.\par \par EKG done 2021 demonstrate normal sinus rhythm rate 92 bpm is otherwise remarkable for left atrial abnormality and nonspecific ST wave changes.\par \par Electrocardiogram done May 27, 2020 demonstrated normal sinus rhythm at a rate of 99 bpm is otherwise remarkable for left atrial abnormality.  There are T wave inversions in V1 and V2 which could represent juvenile T wave pattern.\par \par The patient is also concerned about COVID-19.  She traveled from [place] through [place] back to the [place] 2020.  She will have blood work today for COVID-19 antibodies.  I think her palpitations currently are related to anxiety.\par I think she would benefit from speaking with someone regarding her anxiety.  I have asked her to return to her primary care physician for recommendation.\par \par The patient understands that aerobic exercises must be increased to 40 minutes 4 times per week. A detailed discussion of lifestyle modification was done today. The patient has a good understanding of the diagnosis, and treatment plan. Lifestyle modification was also outlined.

## 2022-06-20 NOTE — REVIEW OF SYSTEMS
[Palpitations] : palpitations [Negative] : Heme/Lymph [Dyspnea on exertion] : not dyspnea during exertion [Anxiety] : anxiety [Under Stress] : under stress

## 2022-06-20 NOTE — REASON FOR VISIT
[CV Risk Factors and Non-Cardiac Disease] : CV risk factors and non-cardiac disease [Follow-Up - Clinic] : a clinic follow-up of [Chest Pain] : chest pain [Hyperlipidemia] : hyperlipidemia [Hypertension] : hypertension [Palpitations] : palpitations [FreeTextEntry1] : murmur

## 2022-06-22 ENCOUNTER — TRANSCRIPTION ENCOUNTER (OUTPATIENT)
Age: 67
End: 2022-06-22

## 2022-12-14 ENCOUNTER — NON-APPOINTMENT (OUTPATIENT)
Age: 67
End: 2022-12-14

## 2022-12-14 ENCOUNTER — APPOINTMENT (OUTPATIENT)
Dept: CARDIOLOGY | Facility: CLINIC | Age: 67
End: 2022-12-14
Payer: MEDICARE

## 2022-12-14 ENCOUNTER — LABORATORY RESULT (OUTPATIENT)
Age: 67
End: 2022-12-14

## 2022-12-14 VITALS
BODY MASS INDEX: 32.65 KG/M2 | OXYGEN SATURATION: 96 % | WEIGHT: 196 LBS | DIASTOLIC BLOOD PRESSURE: 90 MMHG | TEMPERATURE: 98 F | SYSTOLIC BLOOD PRESSURE: 160 MMHG | RESPIRATION RATE: 16 BRPM | HEIGHT: 65 IN | HEART RATE: 80 BPM

## 2022-12-14 DIAGNOSIS — R07.89 OTHER CHEST PAIN: ICD-10-CM

## 2022-12-14 PROCEDURE — 93000 ELECTROCARDIOGRAM COMPLETE: CPT

## 2022-12-14 PROCEDURE — 99214 OFFICE O/P EST MOD 30 MIN: CPT | Mod: 25

## 2022-12-14 PROCEDURE — 93306 TTE W/DOPPLER COMPLETE: CPT

## 2022-12-14 NOTE — DISCUSSION/SUMMARY
[FreeTextEntry1] : This is a 67-year-old female with past medical history significant for status post partial thyroidectomy, (goiter), status post appendectomy, status post 2  sections, who comes in for cardiac consultation. \par She was born in [facility] and has no history of rheumatic fever.  She does not drink excessive caffeine or alcohol.  She lost her  9 years ago to liver cancer (had a history of hepatitis).  Her brother had a heart condition and ultimately  of sepsis.  She was never a smoker.  \par Electrocardiogram done 2022 demonstrate normal sinus rhythm rate of 80 bpm is otherwise unremarkable.\par The patient's blood pressure is elevated today.  This may be due to her level of anxiety, however I have asked her to return to her primary care physician to recheck her blood pressure, and she will follow-up with me in 1 month to recheck her blood pressure.  If her blood pressure remains elevated I would recommend starting her on medical therapy for hypertension.\par The patient had a normal nuclear stress test May 12, 2021.\par The patient is still complaining of episodes of palpitations which he contributes to anxiety.  She feels she does have "an anxiety problem".\par I have asked her to make an appoint with her primary care physician to evaluate her level of anxiety and developed a treatment plan for her.\par She also complains of sleep difficulty.  She is only able to sleep for 3 to 4 hours.  She has daytime fatigue. She may be snoring as well.  Sleep apnea can be contributing to her hypertension and palpitations.\par She will have an home sleep study set up to rule out sleep apnea.\par Echo Doppler examination done May 20, 2021 demonstrated mild mitral and tricuspid valve regurgitation with borderline concentric left ventricular hypertrophy, and satisfactory left ventricular function with an estimated ejection fraction 65%.\par \par Electrocardiogram done 2021 demonstrated normal sinus rhythm rate of 90 bpm is otherwise remarkable for left atrial abnormality.\par \par She had blood work done 2021 which demonstrated a total cholesterol 193, triglycerides of 180, HDL of 60, and LDL direct of 101 mg/dL with a hemoglobin A1c of 5.4.\par The patient remains on Crestor 20 mg/day.  She reports that she was talking to some friends who told her that she should be on the 10 mg dose of Crestor given her "good diet".\par Have explained to the patient that she will get a 50% reduction in her LDL cholesterol with Crestor 20 mg/day and may be a 30 to 40% reduction on Crestor 10 mg/day.\par \par EKG done 2021 demonstrate normal sinus rhythm rate 92 bpm is otherwise remarkable for left atrial abnormality and nonspecific ST wave changes.\par \par Electrocardiogram done May 27, 2020 demonstrated normal sinus rhythm at a rate of 99 bpm is otherwise remarkable for left atrial abnormality.  There are T wave inversions in V1 and V2 which could represent juvenile T wave pattern.\par \par I think she would benefit from speaking with someone regarding her anxiety.  I have asked her to return to her primary care physician for recommendation.\par \par The patient understands that aerobic exercises must be increased to 40 minutes 4 times per week. A detailed discussion of lifestyle modification was done today. The patient has a good understanding of the diagnosis, and treatment plan. Lifestyle modification was also outlined.

## 2022-12-14 NOTE — PHYSICAL EXAM
[Well Developed] : well developed [Well Nourished] : well nourished [No Acute Distress] : no acute distress [Normal Venous Pressure] : normal venous pressure [No Carotid Bruit] : no carotid bruit [Normal S1, S2] : normal S1, S2 [No Murmur] : no murmur [No Rub] : no rub [I] : a grade 1 [Clear Lung Fields] : clear lung fields [Good Air Entry] : good air entry [No Respiratory Distress] : no respiratory distress  [Soft] : abdomen soft [Non Tender] : non-tender [No Masses/organomegaly] : no masses/organomegaly [Normal Bowel Sounds] : normal bowel sounds [Normal Gait] : normal gait [No Edema] : no edema [No Cyanosis] : no cyanosis [No Clubbing] : no clubbing [No Varicosities] : no varicosities [No Rash] : no rash [No Skin Lesions] : no skin lesions [Moves all extremities] : moves all extremities [No Focal Deficits] : no focal deficits [Normal Speech] : normal speech [Alert and Oriented] : alert and oriented [Normal memory] : normal memory [General Appearance - Well Developed] : well developed [Normal Appearance] : normal appearance [Well Groomed] : well groomed [General Appearance - Well Nourished] : well nourished [No Deformities] : no deformities [General Appearance - In No Acute Distress] : no acute distress [Normal Conjunctiva] : the conjunctiva exhibited no abnormalities [Normal Oral Mucosa] : normal oral mucosa [Normal Oropharynx] : normal oropharynx [Normal Jugular Venous A Waves Present] : normal jugular venous A waves present [Normal Jugular Venous V Waves Present] : normal jugular venous V waves present [No Jugular Venous Oquendo A Waves] : no jugular venous oquendo A waves [Respiration, Rhythm And Depth] : normal respiratory rhythm and effort [Exaggerated Use Of Accessory Muscles For Inspiration] : no accessory muscle use [Auscultation Breath Sounds / Voice Sounds] : lungs were clear to auscultation bilaterally [Bowel Sounds] : normal bowel sounds [Abdomen Soft] : soft [Abnormal Walk] : normal gait [Gait - Sufficient For Exercise Testing] : the gait was sufficient for exercise testing [Nail Clubbing] : no clubbing of the fingernails [Cyanosis, Localized] : no localized cyanosis [Skin Color & Pigmentation] : normal skin color and pigmentation [Skin Turgor] : normal skin turgor [] : no rash [Oriented To Time, Place, And Person] : oriented to person, place, and time [Affect] : the affect was normal [Mood] : the mood was normal [No Anxiety] : not feeling anxious [5th Left ICS - MCL] : palpated at the 5th LICS in the midclavicular line [Normal] : normal [No Precordial Heave] : no precordial heave was noted [Normal Rate] : normal [Rhythm Regular] : regular [Normal S1] : normal S1 [Normal S2] : normal S2 [No Gallop] : no gallop heard [II] : a grade 2 [2+] : left 2+ [No Abnormalities] : the abdominal aorta was not enlarged and no bruit was heard [No Pitting Edema] : no pitting edema present [S3] : no S3 [S4] : no S4 [Click] : no click [Pericardial Rub] : no pericardial rub [Right Carotid Bruit] : no bruit heard over the right carotid [Left Carotid Bruit] : no bruit heard over the left carotid [Right Femoral Bruit] : no bruit heard over the right femoral artery [Left Femoral Bruit] : no bruit heard over the left femoral artery

## 2022-12-14 NOTE — REVIEW OF SYSTEMS
[SOB] : shortness of breath [Anxiety] : anxiety [Under Stress] : under stress [Negative] : Heme/Lymph [Dyspnea on exertion] : not dyspnea during exertion [Palpitations] : no palpitations

## 2022-12-14 NOTE — ASSESSMENT
[FreeTextEntry1] : Prior note nurse practitioner Mylene 2022::\par \par This is a 67 year year old female here today for follow up cardiac evaluation. \par She has a past medical history significant for status post partial thyroidectomy, (goiter), status post appendectomy, status post 2  sections and palpitations. Patient shares that her symptoms of palpitations and chest tightness are more related with stress. \par \par CHIEF COMPLAINT:\par Today she is feeling generally well and does not have any complaints at this time.  She is currently only on rosuvastatin 20 mg p.o. daily for cholesterol management. 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 \par \par BLOOD PRESSURE:\par -BP is controlled on today's exam.\par \par -I have discussed the importance of maintaining good BP control and reviewed the newest guidelines with the patient while re-enforcing dietary sodium restrictions to no more than 2-3 g daily, DASH diet, life style modifications as well as the goal of maintaining ideal body weight with the patient today. I have advised the patient to avoid the use of over-the-counter medications/ supplements especially NSAIDS.\par \par BLOOD WORK:\par -New blood work was done today, 2022  to evaluate lipid profile, CBC, BMP, hepatic function, A1C and TSH. \par -Blood work was done 2021 which demonstrated LDL of 101. Pt currently taking Rosuvastatin 20mg PO DAILY. TSH and CBC were normal. \par \par CHOLESTEROL CONTROL:\par -Patient will continue the advised  TLC diet and to continue follow-up for treatment of hyperlipidemia and repeat blood testing with diet and exercise. I have discussed different exercises and the importance of maintenance of optimal body weight. The importance of staying within guidelines and recommendations was stressed to the patient today and they acknowledged that they understand this to me verbally.\par \par  -Ms. GILL was educated and advised that failure to follow-up with my medical recommendations to lower cholesterol can result in severe health consequences therefore, they will continuing a low saturated and low fat diet and to avoid excessive carbohydrates to help reduce triglycerides and that lowering LDL levels is associated with a significant decrease in serious cardiac events including but not limited to heart attack stroke and overall death. We will continue lipid lowering agents as advised based on blood test results and the patient understands to call if they develop severe muscle discomfort or if they have a reddish tinted discoloration in their urine.\par \par TESTING/REPORTS:\par -EKG done today 2022 which demonstrated regular sinus rhythm with nonspecific ST-T wave changes BPM of 89.\par \par -EKG done 21 which demonstrated regular sinus rhythm with nonspecific ST-T wave changes, BPM of 92.\par \par -EKG done 2021 demonstrate normal sinus rhythm rate 92 bpm is otherwise remarkable for left atrial abnormality and nonspecific ST wave changes.\par \par -Patient had a nuclear stress test done on 2021: There is a small to mild defect in the distal anterior wall that is fixed suggestive of breast attenuation artifact.  The observed defects is no longer significant with prone imaging.\par \par -Patient had a normal echocardiogram done 2021: Mild mitral regurgitation, normal left ventricular systolic function, mild tricuspid regurgitation.\par \par The patient had a normal nuclear stress test May 12, 2021.\par \par Echo Doppler examination done May 20, 2021 demonstrated mild mitral and tricuspid valve regurgitation with borderline concentric left ventricular hypertrophy, and satisfactory left ventricular function with an estimated ejection fraction 65%.\par \par \par \par PLAN:\par -She will continue with her usual medications and will contact the office if she is having any complaints between now and their next follow up appointment.\par -The patient will schedule an Echo Doppler examination to evaluate murmur, left ventricular function, chamber size, and rule out hypertrophy.\par -She will stay well hydrated. \par -She will follow up with her PCP.\par \par I have discussed the plan of care with Ms. ZAN GILL  and she  will follow up in 3 months. She is compliant with all of her medications.\par \par The patient understands that aerobic exercises must be increased to minutes 4 times/week and a detailed discussion of lifestyle modification was done today. \par The patient has a good understanding of the diagnosis, treatment plan and lifestyle modification. \par She will contact me at the office for any questions with their care or any changes in their health status.\par \par The plan of care was discussed with supervising physician, Dr. Morse while present in the office at the time of the visit. \par \macario Silveira NP

## 2022-12-15 ENCOUNTER — NON-APPOINTMENT (OUTPATIENT)
Age: 67
End: 2022-12-15

## 2022-12-19 ENCOUNTER — APPOINTMENT (OUTPATIENT)
Dept: CARDIOLOGY | Facility: CLINIC | Age: 67
End: 2022-12-19

## 2022-12-19 DIAGNOSIS — R40.0 SOMNOLENCE: ICD-10-CM

## 2022-12-19 DIAGNOSIS — R06.81 APNEA, NOT ELSEWHERE CLASSIFIED: ICD-10-CM

## 2022-12-19 PROCEDURE — 95806 SLEEP STUDY UNATT&RESP EFFT: CPT

## 2022-12-27 PROBLEM — R40.0 DAYTIME SOMNOLENCE: Status: ACTIVE | Noted: 2022-12-14

## 2022-12-27 PROBLEM — R06.81 APNEIC EPISODE: Status: ACTIVE | Noted: 2022-12-27

## 2022-12-29 ENCOUNTER — TRANSCRIPTION ENCOUNTER (OUTPATIENT)
Age: 67
End: 2022-12-29

## 2023-01-14 PROBLEM — R07.89 CHEST PRESSURE: Status: ACTIVE | Noted: 2021-04-30

## 2023-01-25 ENCOUNTER — APPOINTMENT (OUTPATIENT)
Dept: CARDIOLOGY | Facility: CLINIC | Age: 68
End: 2023-01-25
Payer: MEDICARE

## 2023-01-25 VITALS
BODY MASS INDEX: 32.49 KG/M2 | TEMPERATURE: 97.5 F | HEIGHT: 65 IN | RESPIRATION RATE: 16 BRPM | SYSTOLIC BLOOD PRESSURE: 138 MMHG | OXYGEN SATURATION: 98 % | HEART RATE: 98 BPM | DIASTOLIC BLOOD PRESSURE: 80 MMHG | WEIGHT: 195 LBS

## 2023-01-25 DIAGNOSIS — R00.2 PALPITATIONS: ICD-10-CM

## 2023-01-25 PROCEDURE — 99215 OFFICE O/P EST HI 40 MIN: CPT | Mod: 25

## 2023-01-25 NOTE — DISCUSSION/SUMMARY
[FreeTextEntry1] : Dr. Morse-(PRIOR VISIT and PMH WITH Dr. Morse): \par This is a 66-year-old female with past medical history significant for status post partial thyroidectomy, (goiter), status post appendectomy, status post 2  sections, who comes in for cardiac consultation. \par \par She was born in [facility] and has no history of rheumatic fever.  She does not drink excessive caffeine or alcohol.  She lost her  9 years ago to liver cancer (had a history of hepatitis).  Her brother had a heart condition and ultimately  of sepsis.  She was never a smoker.  \par \par  The patient has been complaining of a one-year history of episodic chest pressure.  She also complains of occasional palpitations best described as a rapid heartbeat usually when she has a lot of stress.\par \par The patient had a normal nuclear stress test May 12, 2021.\par \par Echo Doppler examination done May 20, 2021 demonstrated mild mitral and tricuspid valve regurgitation with borderline concentric left ventricular hypertrophy, and satisfactory left ventricular function with an estimated ejection fraction 65%.\par \par Electrocardiogram done 2021 demonstrated normal sinus rhythm rate of 90 bpm is otherwise remarkable for left atrial abnormality.\par \par The patient still has a degree of anxiety which is contributing to her symptoms.\par \par She had blood work done 2021 which demonstrated a total cholesterol 193, triglycerides of 180, HDL of 60, and LDL direct of 101 mg/dL with a hemoglobin A1c of 5.4.\par The patient remains on Crestor 20 mg/day.  She reports that she was talking to some friends who told her that she should be on the 10 mg dose of Crestor given her "good diet".\par Have explained to the patient that she will get a 50% reduction in her LDL cholesterol with Crestor 20 mg/day and may be a 30 to 40% reduction on Crestor 10 mg/day.\par She will have new blood work done today for lipid panel, and SMA-20.  Further recommendations if necessary can be made at that time.  She will continue on her current diet and exercise program.\par \par EKG done 2021 demonstrate normal sinus rhythm rate 92 bpm is otherwise remarkable for left atrial abnormality and nonspecific ST wave changes.\par \par Electrocardiogram done May 27, 2020 demonstrated normal sinus rhythm at a rate of 99 bpm is otherwise remarkable for left atrial abnormality.  There are T wave inversions in V1 and V2 which could represent juvenile T wave pattern.\par \par The patient is also concerned about COVID-19.  She traveled from [place] through [place] back to the [place] 2020.  She will have blood work today for COVID-19 antibodies.  I think her palpitations currently are related to anxiety.\par I think she would benefit from speaking with someone regarding her anxiety.  I have asked her to return to her primary care physician for recommendation.\par \par The patient understands that aerobic exercises must be increased to 40 minutes 4 times per week. A detailed discussion of lifestyle modification was done today. The patient has a good understanding of the diagnosis, and treatment plan. Lifestyle modification was also outlined.

## 2023-01-25 NOTE — REVIEW OF SYSTEMS
[Palpitations] : palpitations [Anxiety] : anxiety [Under Stress] : under stress [Negative] : Heme/Lymph [Dyspnea on exertion] : not dyspnea during exertion

## 2023-01-25 NOTE — PHYSICAL EXAM
[Well Developed] : well developed [Well Nourished] : well nourished [No Acute Distress] : no acute distress [Normal Venous Pressure] : normal venous pressure [No Carotid Bruit] : no carotid bruit [Normal S1, S2] : normal S1, S2 [No Murmur] : no murmur [No Rub] : no rub [Clear Lung Fields] : clear lung fields [Good Air Entry] : good air entry [No Respiratory Distress] : no respiratory distress  [Soft] : abdomen soft [Non Tender] : non-tender [No Masses/organomegaly] : no masses/organomegaly [Normal Bowel Sounds] : normal bowel sounds [Normal Gait] : normal gait [No Edema] : no edema [No Cyanosis] : no cyanosis [No Clubbing] : no clubbing [No Varicosities] : no varicosities [No Rash] : no rash [No Skin Lesions] : no skin lesions [Moves all extremities] : moves all extremities [No Focal Deficits] : no focal deficits [Normal Speech] : normal speech [Alert and Oriented] : alert and oriented [Normal memory] : normal memory [General Appearance - Well Developed] : well developed [Normal Appearance] : normal appearance [Well Groomed] : well groomed [General Appearance - Well Nourished] : well nourished [No Deformities] : no deformities [General Appearance - In No Acute Distress] : no acute distress [Normal Conjunctiva] : the conjunctiva exhibited no abnormalities [Normal Oral Mucosa] : normal oral mucosa [Normal Oropharynx] : normal oropharynx [Normal Jugular Venous A Waves Present] : normal jugular venous A waves present [Normal Jugular Venous V Waves Present] : normal jugular venous V waves present [No Jugular Venous Oquendo A Waves] : no jugular venous oquendo A waves [Respiration, Rhythm And Depth] : normal respiratory rhythm and effort [Exaggerated Use Of Accessory Muscles For Inspiration] : no accessory muscle use [Auscultation Breath Sounds / Voice Sounds] : lungs were clear to auscultation bilaterally [Bowel Sounds] : normal bowel sounds [Abdomen Soft] : soft [Abnormal Walk] : normal gait [Gait - Sufficient For Exercise Testing] : the gait was sufficient for exercise testing [Cyanosis, Localized] : no localized cyanosis [Nail Clubbing] : no clubbing of the fingernails [Skin Color & Pigmentation] : normal skin color and pigmentation [Skin Turgor] : normal skin turgor [] : no rash [Oriented To Time, Place, And Person] : oriented to person, place, and time [Affect] : the affect was normal [Mood] : the mood was normal [No Anxiety] : not feeling anxious [5th Left ICS - MCL] : palpated at the 5th LICS in the midclavicular line [Normal] : normal [No Precordial Heave] : no precordial heave was noted [Normal Rate] : normal [Rhythm Regular] : regular [Normal S1] : normal S1 [Normal S2] : normal S2 [No Gallop] : no gallop heard [II] : a grade 2 [2+] : left 2+ [No Abnormalities] : the abdominal aorta was not enlarged and no bruit was heard [No Pitting Edema] : no pitting edema present [S3] : no S3 [S4] : no S4 [Click] : no click [Pericardial Rub] : no pericardial rub [Right Carotid Bruit] : no bruit heard over the right carotid [Left Carotid Bruit] : no bruit heard over the left carotid [Right Femoral Bruit] : no bruit heard over the right femoral artery [Left Femoral Bruit] : no bruit heard over the left femoral artery

## 2023-02-01 ENCOUNTER — APPOINTMENT (OUTPATIENT)
Dept: PULMONOLOGY | Facility: CLINIC | Age: 68
End: 2023-02-01
Payer: MEDICARE

## 2023-02-01 VITALS
OXYGEN SATURATION: 96 % | RESPIRATION RATE: 16 BRPM | DIASTOLIC BLOOD PRESSURE: 86 MMHG | SYSTOLIC BLOOD PRESSURE: 130 MMHG | BODY MASS INDEX: 32.49 KG/M2 | WEIGHT: 195 LBS | HEIGHT: 65 IN | TEMPERATURE: 97.1 F | HEART RATE: 94 BPM

## 2023-02-01 DIAGNOSIS — R06.83 SNORING: ICD-10-CM

## 2023-02-01 PROCEDURE — ZZZZZ: CPT

## 2023-02-01 PROCEDURE — 94010 BREATHING CAPACITY TEST: CPT

## 2023-02-01 PROCEDURE — 94727 GAS DIL/WSHOT DETER LNG VOL: CPT

## 2023-02-01 PROCEDURE — 94729 DIFFUSING CAPACITY: CPT

## 2023-02-01 PROCEDURE — 94618 PULMONARY STRESS TESTING: CPT

## 2023-02-01 PROCEDURE — 99204 OFFICE O/P NEW MOD 45 MIN: CPT | Mod: 25

## 2023-02-02 ENCOUNTER — LABORATORY RESULT (OUTPATIENT)
Age: 68
End: 2023-02-02

## 2023-02-02 LAB
25(OH)D3 SERPL-MCNC: 31.7 NG/ML
BASOPHILS # BLD AUTO: 0.04 K/UL
BASOPHILS NFR BLD AUTO: 0.7 %
EOSINOPHIL # BLD AUTO: 0.14 K/UL
EOSINOPHIL NFR BLD AUTO: 2.4 %
HCT VFR BLD CALC: 39.1 %
HGB BLD-MCNC: 12.5 G/DL
IMM GRANULOCYTES NFR BLD AUTO: 0.2 %
LYMPHOCYTES # BLD AUTO: 2.92 K/UL
LYMPHOCYTES NFR BLD AUTO: 50 %
MAN DIFF?: NORMAL
MCHC RBC-ENTMCNC: 31.6 PG
MCHC RBC-ENTMCNC: 32 GM/DL
MCV RBC AUTO: 99 FL
MONOCYTES # BLD AUTO: 0.45 K/UL
MONOCYTES NFR BLD AUTO: 7.7 %
NEUTROPHILS # BLD AUTO: 2.28 K/UL
NEUTROPHILS NFR BLD AUTO: 39 %
PLATELET # BLD AUTO: 179 K/UL
RBC # BLD: 3.95 M/UL
RBC # FLD: 12.4 %
WBC # FLD AUTO: 5.84 K/UL

## 2023-02-06 LAB
A ALTERNATA IGE QN: <0.1 KUA/L
A ALTERNATA IGE QN: <0.1 KUA/L
A FUMIGATUS IGE QN: <0.1 KUA/L
A FUMIGATUS IGE QN: <0.1 KUA/L
BERMUDA GRASS IGE QN: <0.1 KUA/L
BOXELDER IGE QN: <0.1 KUA/L
C ALBICANS IGE QN: <0.1 KUA/L
C HERBARUM IGE QN: <0.1 KUA/L
C HERBARUM IGE QN: <0.1 KUA/L
CALIF WALNUT IGE QN: <0.1 KUA/L
CAT DANDER IGE QN: <0.1 KUA/L
CAT DANDER IGE QN: <0.1 KUA/L
CLAM IGE QN: <0.1 KUA/L
CMN PIGWEED IGE QN: <0.1 KUA/L
CODFISH IGE QN: <0.1 KUA/L
COMMON RAGWEED IGE QN: <0.1 KUA/L
COMMON RAGWEED IGE QN: <0.1 KUA/L
CORN IGE QN: <0.1 KUA/L
COTTONWOOD IGE QN: <0.1 KUA/L
COW MILK IGE QN: <0.1 KUA/L
D FARINAE IGE QN: 0.18 KUA/L
D FARINAE IGE QN: 0.18 KUA/L
D PTERONYSS IGE QN: 0.16 KUA/L
D PTERONYSS IGE QN: 0.16 KUA/L
DEPRECATED A ALTERNATA IGE RAST QL: 0
DEPRECATED A ALTERNATA IGE RAST QL: 0
DEPRECATED A FUMIGATUS IGE RAST QL: 0
DEPRECATED A FUMIGATUS IGE RAST QL: 0
DEPRECATED BERMUDA GRASS IGE RAST QL: 0
DEPRECATED BOXELDER IGE RAST QL: 0
DEPRECATED C ALBICANS IGE RAST QL: 0
DEPRECATED C HERBARUM IGE RAST QL: 0
DEPRECATED C HERBARUM IGE RAST QL: 0
DEPRECATED CAT DANDER IGE RAST QL: 0
DEPRECATED CAT DANDER IGE RAST QL: 0
DEPRECATED CLAM IGE RAST QL: 0
DEPRECATED CODFISH IGE RAST QL: 0
DEPRECATED COMMON PIGWEED IGE RAST QL: 0
DEPRECATED COMMON RAGWEED IGE RAST QL: 0
DEPRECATED COMMON RAGWEED IGE RAST QL: 0
DEPRECATED CORN IGE RAST QL: 0
DEPRECATED COTTONWOOD IGE RAST QL: 0
DEPRECATED COW MILK IGE RAST QL: 0
DEPRECATED D FARINAE IGE RAST QL: NORMAL
DEPRECATED D FARINAE IGE RAST QL: NORMAL
DEPRECATED D PTERONYSS IGE RAST QL: NORMAL
DEPRECATED D PTERONYSS IGE RAST QL: NORMAL
DEPRECATED DOG DANDER IGE RAST QL: 0
DEPRECATED DOG DANDER IGE RAST QL: 0
DEPRECATED DUCK FEATHER IGE RAST QL: 0
DEPRECATED EGG WHITE IGE RAST QL: 0
DEPRECATED GOOSE FEATHER IGE RAST QL: 0
DEPRECATED GOOSEFOOT IGE RAST QL: 0
DEPRECATED LONDON PLANE IGE RAST QL: 0
DEPRECATED M RACEMOSUS IGE RAST QL: 0
DEPRECATED MOUSE URINE PROT IGE RAST QL: 0
DEPRECATED MUGWORT IGE RAST QL: 0
DEPRECATED P NOTATUM IGE RAST QL: 0
DEPRECATED PEANUT IGE RAST QL: 0
DEPRECATED RED CEDAR IGE RAST QL: 0
DEPRECATED ROACH IGE RAST QL: NORMAL
DEPRECATED ROACH IGE RAST QL: NORMAL
DEPRECATED SCALLOP IGE RAST QL: <0.1 KUA/L
DEPRECATED SESAME SEED IGE RAST QL: 0
DEPRECATED SHEEP SORREL IGE RAST QL: 0
DEPRECATED SHRIMP IGE RAST QL: NORMAL
DEPRECATED SILVER BIRCH IGE RAST QL: 0
DEPRECATED SOYBEAN IGE RAST QL: 0
DEPRECATED TIMOTHY IGE RAST QL: 0
DEPRECATED TIMOTHY IGE RAST QL: 0
DEPRECATED WALNUT IGE RAST QL: 0
DEPRECATED WHEAT IGE RAST QL: 0
DEPRECATED WHITE ASH IGE RAST QL: 0
DEPRECATED WHITE OAK IGE RAST QL: 0
DEPRECATED WHITE OAK IGE RAST QL: 0
DOG DANDER IGE QN: <0.1 KUA/L
DOG DANDER IGE QN: <0.1 KUA/L
DUCK FEATHER IGE QN: <0.1 KUA/L
EGG WHITE IGE QN: <0.1 KUA/L
GOOSE FEATHER IGE QN: <0.1 KUA/L
GOOSEFOOT IGE QN: <0.1 KUA/L
LONDON PLANE IGE QN: <0.1 KUA/L
M RACEMOSUS IGE QN: <0.1 KUA/L
MOUSE URINE PROT IGE QN: <0.1 KUA/L
MUGWORT IGE QN: <0.1 KUA/L
MULBERRY (T70) CLASS: 0
MULBERRY (T70) CONC: <0.1 KUA/L
P NOTATUM IGE QN: <0.1 KUA/L
PEANUT IGE QN: <0.1 KUA/L
RED CEDAR IGE QN: <0.1 KUA/L
ROACH IGE QN: 0.12 KUA/L
ROACH IGE QN: 0.12 KUA/L
SCALLOP IGE QN: 0
SCALLOP IGE QN: 0.17 KUA/L
SESAME SEED IGE QN: <0.1 KUA/L
SHEEP SORREL IGE QN: <0.1 KUA/L
SILVER BIRCH IGE QN: <0.1 KUA/L
SOYBEAN IGE QN: <0.1 KUA/L
TIMOTHY IGE QN: <0.1 KUA/L
TIMOTHY IGE QN: <0.1 KUA/L
TOTAL IGE SMQN RAST: 145 KU/L
TREE ALLERG MIX1 IGE QL: 0
WALNUT IGE QN: <0.1 KUA/L
WHEAT IGE QN: <0.1 KUA/L
WHITE ASH IGE QN: <0.1 KUA/L
WHITE ELM IGE QN: 0
WHITE ELM IGE QN: <0.1 KUA/L
WHITE OAK IGE QN: <0.1 KUA/L
WHITE OAK IGE QN: <0.1 KUA/L

## 2023-02-07 ENCOUNTER — NON-APPOINTMENT (OUTPATIENT)
Age: 68
End: 2023-02-07

## 2023-02-07 PROBLEM — R06.83 SNORING: Status: ACTIVE | Noted: 2022-12-14

## 2023-02-07 NOTE — ASSESSMENT
[FreeTextEntry1] : Ms. Flores is 67-year-old female with a history of chronic sinusitis, hyperlipidemia, and anxiety who now presents to the office for pulmonary evaluation. \par \par Her chief concern is a recent sleep study.\par \par 1. Snoring/mild VU\par -add PSG\par \par 2. SOB with exertion\par -add albuterol inhaler 2 puffs Q6H PRN\par -add allergy labs (list of [facility] labs given)\par -add MCT\par \par Patient to follow up with Dr. Gregg as scheduled.\par Patient to call with further questions and concerns.\par Patient verbalizes understanding of care plan and is agreeable.\par

## 2023-02-07 NOTE — REVIEW OF SYSTEMS
[Fatigue] : fatigue [Negative] : Psychiatric [Fever] : no fever [Recent Wt Gain (___ Lbs)] : ~T no recent weight gain [EDS] : no EDS [Chills] : no chills [Poor Appetite] : no poor appetite [Recent Wt Loss (___ Lbs)] : ~T no recent weight loss

## 2023-02-07 NOTE — HISTORY OF PRESENT ILLNESS
[Never] : never [TextBox_4] : Ms. Flores is 67-year-old female with a history of chronic sinusitis, hyperlipidemia, and anxiety who now presents to the office for pulmonary evaluation. \par \par Her chief concern is a recent sleep study.\par \par Patient reports she had sleep study with cardiologist Dr. Morse which showed she has mild obstructive sleep apnea.  She was asked to follow-up with pulmonary medicine for treatment.  Patient states since the pandemic she has been under a lot of stress.  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  Patient admits to snoring, nonrestorative sleep, abnormal leg movements, and multiple awakenings at night.  She states during the home sleep study she was only able to sleep for about 4 hours she was interrupted by her grandson.  Patient is unsure if the data is accurate.\par \par Patient reports she had pneumonia at age 50.  She states she gets shortness of breath with incline, going up the stairs.  Patient states otherwise she has no pulmonary complaints.\par \par Patient denies seasonal allergies or GERD.\par \par Patient is COVID vaccinated and boosted x 2.  She denies COVID infection.\par \par Patient denies fever, chills, shortness of breath at rest, cough, wheezing, nasal congestion, runny nose or heartburn/reflux.\par \par

## 2023-02-07 NOTE — PROCEDURE
[FreeTextEntry1] : PFT'S performed in office show minimal obstructive lung defect.  Lung volumes are within normal limits mild decrease in diffusion capacity.\par FEV: 95\par FEV1/FVC Ratio: 78\par PIF42-77%: 84\par DLCO: 71\par \par FENO: unable to perform\par \par 6MWT Performed in office shows:\par RA SPO2 @ REST: 93\par RA SPO2 @ EXERTION:95\par \par

## 2023-02-08 ENCOUNTER — TRANSCRIPTION ENCOUNTER (OUTPATIENT)
Age: 68
End: 2023-02-08

## 2023-02-13 ENCOUNTER — NON-APPOINTMENT (OUTPATIENT)
Age: 68
End: 2023-02-13

## 2023-02-14 ENCOUNTER — TRANSCRIPTION ENCOUNTER (OUTPATIENT)
Age: 68
End: 2023-02-14

## 2023-03-15 ENCOUNTER — APPOINTMENT (OUTPATIENT)
Dept: PULMONOLOGY | Facility: CLINIC | Age: 68
End: 2023-03-15

## 2023-03-29 ENCOUNTER — NON-APPOINTMENT (OUTPATIENT)
Age: 68
End: 2023-03-29

## 2023-04-17 ENCOUNTER — APPOINTMENT (OUTPATIENT)
Dept: SLEEP CENTER | Facility: CLINIC | Age: 68
End: 2023-04-17

## 2023-04-27 ENCOUNTER — APPOINTMENT (OUTPATIENT)
Dept: SLEEP CENTER | Facility: CLINIC | Age: 68
End: 2023-04-27

## 2023-05-09 ENCOUNTER — APPOINTMENT (OUTPATIENT)
Dept: CARDIOLOGY | Facility: CLINIC | Age: 68
End: 2023-05-09

## 2023-11-01 ENCOUNTER — RESULT CHARGE (OUTPATIENT)
Age: 68
End: 2023-11-01

## 2023-11-02 ENCOUNTER — LABORATORY RESULT (OUTPATIENT)
Age: 68
End: 2023-11-02

## 2023-11-02 ENCOUNTER — NON-APPOINTMENT (OUTPATIENT)
Age: 68
End: 2023-11-02

## 2023-11-02 ENCOUNTER — APPOINTMENT (OUTPATIENT)
Dept: CARDIOLOGY | Facility: CLINIC | Age: 68
End: 2023-11-02
Payer: MEDICARE

## 2023-11-02 VITALS
HEIGHT: 65 IN | RESPIRATION RATE: 16 BRPM | BODY MASS INDEX: 32.49 KG/M2 | HEART RATE: 79 BPM | OXYGEN SATURATION: 98 % | WEIGHT: 195 LBS | TEMPERATURE: 97 F

## 2023-11-02 VITALS
RESPIRATION RATE: 16 BRPM | HEART RATE: 97 BPM | DIASTOLIC BLOOD PRESSURE: 82 MMHG | SYSTOLIC BLOOD PRESSURE: 127 MMHG | OXYGEN SATURATION: 98 % | WEIGHT: 195 LBS | BODY MASS INDEX: 32.49 KG/M2 | TEMPERATURE: 97 F | HEIGHT: 65 IN

## 2023-11-02 PROCEDURE — 99214 OFFICE O/P EST MOD 30 MIN: CPT | Mod: 25

## 2023-11-02 PROCEDURE — 93000 ELECTROCARDIOGRAM COMPLETE: CPT | Mod: NC

## 2023-11-27 ENCOUNTER — TRANSCRIPTION ENCOUNTER (OUTPATIENT)
Age: 68
End: 2023-11-27

## 2024-02-19 ENCOUNTER — APPOINTMENT (OUTPATIENT)
Dept: CARDIOLOGY | Facility: CLINIC | Age: 69
End: 2024-02-19
Payer: MEDICARE

## 2024-02-19 VITALS
TEMPERATURE: 97.6 F | BODY MASS INDEX: 32.49 KG/M2 | HEART RATE: 84 BPM | SYSTOLIC BLOOD PRESSURE: 125 MMHG | OXYGEN SATURATION: 95 % | DIASTOLIC BLOOD PRESSURE: 78 MMHG | HEIGHT: 65 IN | RESPIRATION RATE: 16 BRPM | WEIGHT: 195 LBS

## 2024-02-19 DIAGNOSIS — F43.9 REACTION TO SEVERE STRESS, UNSPECIFIED: ICD-10-CM

## 2024-02-19 DIAGNOSIS — R06.02 SHORTNESS OF BREATH: ICD-10-CM

## 2024-02-19 DIAGNOSIS — R20.0 ANESTHESIA OF SKIN: ICD-10-CM

## 2024-02-19 DIAGNOSIS — R06.09 OTHER FORMS OF DYSPNEA: ICD-10-CM

## 2024-02-19 PROCEDURE — G2211 COMPLEX E/M VISIT ADD ON: CPT

## 2024-02-19 PROCEDURE — 99214 OFFICE O/P EST MOD 30 MIN: CPT

## 2024-02-19 RX ORDER — ALBUTEROL SULFATE 90 UG/1
108 (90 BASE) INHALANT RESPIRATORY (INHALATION)
Qty: 1 | Refills: 2 | Status: COMPLETED | COMMUNITY
Start: 2023-02-01 | End: 2024-02-19

## 2024-02-19 NOTE — DISCUSSION/SUMMARY
[FreeTextEntry1] : Dr. Morse-(PRIOR VISIT and PMH WITH Dr. Morse): \par  This is a 66-year-old female with past medical history significant for status post partial thyroidectomy, (goiter), status post appendectomy, status post 2  sections, who comes in for cardiac consultation. \par  \par  She was born in [facility] and has no history of rheumatic fever.  She does not drink excessive caffeine or alcohol.  She lost her  9 years ago to liver cancer (had a history of hepatitis).  Her brother had a heart condition and ultimately  of sepsis.  She was never a smoker.  \par  \par   The patient has been complaining of a one-year history of episodic chest pressure.  She also complains of occasional palpitations best described as a rapid heartbeat usually when she has a lot of stress.\par  \par  The patient had a normal nuclear stress test May 12, 2021.\par  \par  Echo Doppler examination done May 20, 2021 demonstrated mild mitral and tricuspid valve regurgitation with borderline concentric left ventricular hypertrophy, and satisfactory left ventricular function with an estimated ejection fraction 65%.\par  \par  Electrocardiogram done 2021 demonstrated normal sinus rhythm rate of 90 bpm is otherwise remarkable for left atrial abnormality.\par  \par  The patient still has a degree of anxiety which is contributing to her symptoms.\par  \par  She had blood work done 2021 which demonstrated a total cholesterol 193, triglycerides of 180, HDL of 60, and LDL direct of 101 mg/dL with a hemoglobin A1c of 5.4.\par  The patient remains on Crestor 20 mg/day.  She reports that she was talking to some friends who told her that she should be on the 10 mg dose of Crestor given her "good diet".\par  Have explained to the patient that she will get a 50% reduction in her LDL cholesterol with Crestor 20 mg/day and may be a 30 to 40% reduction on Crestor 10 mg/day.\par  She will have new blood work done today for lipid panel, and SMA-20.  Further recommendations if necessary can be made at that time.  She will continue on her current diet and exercise program.\par  \par  EKG done 2021 demonstrate normal sinus rhythm rate 92 bpm is otherwise remarkable for left atrial abnormality and nonspecific ST wave changes.\par  \par  Electrocardiogram done May 27, 2020 demonstrated normal sinus rhythm at a rate of 99 bpm is otherwise remarkable for left atrial abnormality.  There are T wave inversions in V1 and V2 which could represent juvenile T wave pattern.\par  \par  The patient is also concerned about COVID-19.  She traveled from [place] through [place] back to the [place] 2020.  She will have blood work today for COVID-19 antibodies.  I think her palpitations currently are related to anxiety.\par  I think she would benefit from speaking with someone regarding her anxiety.  I have asked her to return to her primary care physician for recommendation.\par  \par  The patient understands that aerobic exercises must be increased to 40 minutes 4 times per week. A detailed discussion of lifestyle modification was done today. The patient has a good understanding of the diagnosis, and treatment plan. Lifestyle modification was also outlined.

## 2024-02-19 NOTE — ASSESSMENT
[FreeTextEntry1] : This is a 68-year-old female here today for follow up cardiac evaluation.  She has a past medical history significant for s/p partial thyroidectomy, (goiter), status post appendectomy, status post 2  sections and hx of palpitations related to stress.    HPI: Today she is feeling generally well and does not have any concerns. Denies chest pain, palpitations, shortness of breath, dizziness, and syncope. 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 She has not been sleeping well at night due to this stress which creates daytime fatigue.   She is also complaining of left leg numbness during the night that starts at her knee and travels all the way to her ankle. Denies pain in her bilateral lower extremities but does get mild swelling sometimes of her ankles. I have recommended she begin wearing compression socks throughout the day and elevate her legs while at home. She will also schedule a bilateral venous doppler exam and make an appointment with Dr. Green of vascular surgery.    Current Medications: Rosuvastatin 10 mg daily for cholesterol management.  She is also interested in cardiometabolic weight loss assistance after not being successful in losing weight through lifestyle modifications and diet changes. She would be a good candidate for GLP-1 agonist therapy due to her inability to lose weight through exercise and diet modification. She will be started on Wegovy 0.25 mg injection weekly with titration of dosage every 4 weeks based on patient tolerance and weight loss.   CARDIOMETABOLIC/WEIGHT LOSS MANAGEMENT: Patient denies any personal or family history of MTC (medullary thyroid carcinoma) or MEN 2 (multiple endocrine neoplasia syndrome type 2) and denies pregnancy. She was told that the medications are contraindicated with this clinical history. Patient was counseled regarding symptoms of thyroid tumors (e.g., a mass in the neck, dysphagia, dyspnea, persistent hoarseness). Patient was also told that there is a risk of pancreatitis with use of GLP-1 agonists. She was told to be aware of persistent severe abdominal pain, sometimes radiating to the back with or without vomiting. Patient was informed that adherence to a diet and exercise program in addition to taking the medication will optimize weight loss. She was also encouraged to drink 8 glasses of water daily.   BLOOD PRESSURE: Controlled during today's visit.   -I have discussed the importance of maintaining good BP control and reviewed the newest guidelines with the patient while re-enforcing dietary sodium restrictions to no more than 2-3 g daily, DASH diet, life style modifications as well as the goal of maintaining ideal body weight with the patient today. I have advised the patient to avoid the use of over-the-counter medications/ supplements especially NSAIDS.   BLOOD WORK:  *LDL target goal < 100* -Lipid panel done 2023 demonstrated triglyceride 131, cholesterol 204, HDL 65, , non-, LDL direct 117. -New blood work was done 2022 demonstrated normal lipid profile with LDL-direct 100. -New blood work was done 2022 which demonstrated normal lipid profile. -Blood work was done 2021 which demonstrated LDL of 101.    TESTING/REPORTS: -EKG done 2023 demonstrating regular sinus rhythm with nonspecific ST-T wave changes BPM of 79 unchanged from previous.   -The patient had a sleep study done on 2022 which demonstrated mild sleep apnea. She wore the device for 4 hours and 34 minutes. She was instructed to follow-up with pulmonary.  -Echocardiogram done 2022 demonstrated normal left ventricular systolic function ejection fraction 63%. Mild mitral, tricuspid and pulmonic regurgitation.  -EKG done 2022 which demonstrated regular sinus rhythm with nonspecific ST-T wave changes BPM of 89.  -EKG done 21 which demonstrated regular sinus rhythm with nonspecific ST-T wave changes, BPM of 92.  -EKG done 2021 demonstrate normal sinus rhythm rate 92 bpm is otherwise remarkable for left atrial abnormality and nonspecific ST wave changes.  -Patient had a nuclear stress test done on 2021: There is a small to mild defect in the distal anterior wall that is fixed suggestive of breast attenuation artifact. The observed defects is no longer significant with prone imaging.  -Patient had a normal echocardiogram done 2021: Mild mitral regurgitation, normal left ventricular systolic function, mild tricuspid regurgitation.  -The patient had a normal nuclear stress test May 12, 2021.  -Echo Doppler examination done May 20, 2021 demonstrated mild mitral and tricuspid valve regurgitation with borderline concentric left ventricular hypertrophy, and satisfactory left ventricular function with an estimated ejection fraction 65%.   PLAN: -She will complete a bilateral venous doppler exam and follow-up with Dr. Green for evaluation.  -She will begin Wegovy 0.25 mg weekly injection for cardiometabolic weight loss assistance.  -She will continue with her usual medication and will contact the office if she is having any complaints between now and their next follow up appointment. -Patient will schedule echocardiogram doppler examination to evaluate murmur, left ventricular function, chamber size, and rule out hypertrophy. -She will schedule an exercise stress test to evaluate for significant coronary artery disease. -She will complete an AAYUSH at next visit to evaluate for peripheral vascular insufficiency  I have discussed the plan of care with Ms. ZAN GILL and she will follow up in 3 months. She is compliant with all of her medications.  The patient understands that aerobic exercises must be increased to minutes 4 times/week and a detailed discussion of lifestyle modification was done today.  The patient has a good understanding of the diagnosis, treatment plan and lifestyle modification.  She will contact me at the office for any questions with their care or any changes in their health status.  The plan of care was discussed with supervising physician, Dr. Morse while present in the office at the time of the visit.  CHARLENE Hubbard NP

## 2024-02-19 NOTE — REVIEW OF SYSTEMS
[Dyspnea on exertion] : dyspnea during exertion [Anxiety] : anxiety [Under Stress] : under stress [Negative] : Heme/Lymph [SOB] : no shortness of breath [Chest Discomfort] : no chest discomfort [Lower Ext Edema] : no extremity edema [Leg Claudication] : no intermittent leg claudication [Palpitations] : no palpitations [Orthopnea] : no orthopnea [PND] : no PND [Syncope] : no syncope

## 2024-02-19 NOTE — PHYSICAL EXAM
[Well Developed] : well developed [Well Nourished] : well nourished [No Acute Distress] : no acute distress [Normal Venous Pressure] : normal venous pressure [No Carotid Bruit] : no carotid bruit [Normal S1, S2] : normal S1, S2 [No Rub] : no rub [I] : a grade 1 [Clear Lung Fields] : clear lung fields [Good Air Entry] : good air entry [No Respiratory Distress] : no respiratory distress  [Soft] : abdomen soft [Non Tender] : non-tender [No Masses/organomegaly] : no masses/organomegaly [Normal Bowel Sounds] : normal bowel sounds [Normal Gait] : normal gait [No Edema] : no edema [No Cyanosis] : no cyanosis [No Clubbing] : no clubbing [No Varicosities] : no varicosities [No Rash] : no rash [No Skin Lesions] : no skin lesions [Moves all extremities] : moves all extremities [No Focal Deficits] : no focal deficits [Normal Speech] : normal speech [Alert and Oriented] : alert and oriented [Normal memory] : normal memory [General Appearance - Well Developed] : well developed [Normal Appearance] : normal appearance [General Appearance - Well Nourished] : well nourished [Well Groomed] : well groomed [No Deformities] : no deformities [General Appearance - In No Acute Distress] : no acute distress [Normal Conjunctiva] : the conjunctiva exhibited no abnormalities [Normal Oral Mucosa] : normal oral mucosa [Normal Oropharynx] : normal oropharynx [Normal Jugular Venous A Waves Present] : normal jugular venous A waves present [Normal Jugular Venous V Waves Present] : normal jugular venous V waves present [No Jugular Venous Oquendo A Waves] : no jugular venous oquendo A waves [Respiration, Rhythm And Depth] : normal respiratory rhythm and effort [Exaggerated Use Of Accessory Muscles For Inspiration] : no accessory muscle use [Auscultation Breath Sounds / Voice Sounds] : lungs were clear to auscultation bilaterally [Bowel Sounds] : normal bowel sounds [Abdomen Soft] : soft [Abnormal Walk] : normal gait [Gait - Sufficient For Exercise Testing] : the gait was sufficient for exercise testing [Nail Clubbing] : no clubbing of the fingernails [Cyanosis, Localized] : no localized cyanosis [Skin Color & Pigmentation] : normal skin color and pigmentation [Skin Turgor] : normal skin turgor [] : no rash [Oriented To Time, Place, And Person] : oriented to person, place, and time [Affect] : the affect was normal [Mood] : the mood was normal [No Anxiety] : not feeling anxious [5th Left ICS - MCL] : palpated at the 5th LICS in the midclavicular line [Normal] : normal [No Precordial Heave] : no precordial heave was noted [Normal Rate] : normal [Rhythm Regular] : regular [Normal S1] : normal S1 [Normal S2] : normal S2 [No Gallop] : no gallop heard [II] : a grade 2 [2+] : left 2+ [No Abnormalities] : the abdominal aorta was not enlarged and no bruit was heard [No Pitting Edema] : no pitting edema present [Apical Thrill] : no thrill palpable at the apex [S3] : no S3 [S4] : no S4 [Click] : no click [Pericardial Rub] : no pericardial rub [Right Carotid Bruit] : no bruit heard over the right carotid [Left Carotid Bruit] : no bruit heard over the left carotid [Right Femoral Bruit] : no bruit heard over the right femoral artery [Left Femoral Bruit] : no bruit heard over the left femoral artery

## 2024-02-19 NOTE — REASON FOR VISIT
[CV Risk Factors and Non-Cardiac Disease] : CV risk factors and non-cardiac disease [Follow-Up - Clinic] : a clinic follow-up of [Chest Pain] : chest pain [Hyperlipidemia] : hyperlipidemia [Hypertension] : hypertension [Palpitations] : palpitations [FreeTextEntry3] : Dr. Wilson  [FreeTextEntry1] : murmur

## 2024-02-22 RX ORDER — SEMAGLUTIDE 0.25 MG/.5ML
0.25 INJECTION, SOLUTION SUBCUTANEOUS
Qty: 1 | Refills: 1 | Status: DISCONTINUED | COMMUNITY
Start: 2024-02-19 | End: 2024-02-21

## 2024-05-01 RX ORDER — ROSUVASTATIN CALCIUM 10 MG/1
10 TABLET, FILM COATED ORAL
Qty: 90 | Refills: 1 | Status: ACTIVE | COMMUNITY
Start: 2020-06-03 | End: 1900-01-01

## 2024-05-02 ENCOUNTER — RX RENEWAL (OUTPATIENT)
Age: 69
End: 2024-05-02

## 2024-05-15 ENCOUNTER — APPOINTMENT (OUTPATIENT)
Dept: VASCULAR SURGERY | Facility: CLINIC | Age: 69
End: 2024-05-15
Payer: MEDICARE

## 2024-05-15 VITALS
TEMPERATURE: 97.8 F | WEIGHT: 198 LBS | HEART RATE: 71 BPM | DIASTOLIC BLOOD PRESSURE: 81 MMHG | BODY MASS INDEX: 32.99 KG/M2 | SYSTOLIC BLOOD PRESSURE: 133 MMHG | HEIGHT: 65 IN

## 2024-05-15 PROCEDURE — 93970 EXTREMITY STUDY: CPT

## 2024-05-15 PROCEDURE — 99203 OFFICE O/P NEW LOW 30 MIN: CPT

## 2024-05-15 NOTE — PHYSICAL EXAM
[Normal Breath Sounds] : Normal breath sounds [Normal Rate and Rhythm] : normal rate and rhythm [2+] : left 2+ [Ankle Swelling (On Exam)] : present [Ankle Swelling On The Left] : of the left ankle [Ankle Swelling On The Right] : mild [Alert] : alert [Oriented to Person] : oriented to person [Oriented to Place] : oriented to place [Oriented to Time] : oriented to time [Varicose Veins Of Lower Extremities] : not present [] : not present [de-identified] : NAD [de-identified] : Normal strength and motor exam

## 2024-05-15 NOTE — ASSESSMENT
[FreeTextEntry1] : 68F with left leg tingling/numbness VDUS: no svt/dvt/reflux On exam, palp pulses, no claudication, no wounds, or rest pain On history and exam, symptoms sounds more like neurogenic claudication given back pain and radiation of tingling from the back. Recommend going back to PCP and referral to back specialist or neurology to asses sfor neurogenic etiology of pain/tingling. no arterial or venous insufficiency found to explain pt's symptoms

## 2024-05-15 NOTE — HISTORY OF PRESENT ILLNESS
[FreeTextEntry1] : 68F p/w left leg intermittent numbness and swelling for a few months. Denies any trauma. Just developed overtime. Seems like starts from the back and goes to the legs. Not much on the right side. No claudication, rest pain, or tissue loss. No swelling, varicose veins, or spider veins. No skin thickening or hyperpigmentation. No wounds in the feet. Able to ambulate.

## 2024-05-16 ENCOUNTER — APPOINTMENT (OUTPATIENT)
Dept: CARDIOLOGY | Facility: CLINIC | Age: 69
End: 2024-05-16

## 2024-05-16 ENCOUNTER — RESULT CHARGE (OUTPATIENT)
Age: 69
End: 2024-05-16

## 2024-05-16 PROCEDURE — 93306 TTE W/DOPPLER COMPLETE: CPT

## 2024-05-17 ENCOUNTER — NON-APPOINTMENT (OUTPATIENT)
Age: 69
End: 2024-05-17

## 2024-05-17 ENCOUNTER — LABORATORY RESULT (OUTPATIENT)
Age: 69
End: 2024-05-17

## 2024-05-17 ENCOUNTER — APPOINTMENT (OUTPATIENT)
Dept: CARDIOLOGY | Facility: CLINIC | Age: 69
End: 2024-05-17
Payer: MEDICARE

## 2024-05-17 VITALS
RESPIRATION RATE: 16 BRPM | TEMPERATURE: 97.8 F | SYSTOLIC BLOOD PRESSURE: 128 MMHG | WEIGHT: 198 LBS | DIASTOLIC BLOOD PRESSURE: 77 MMHG | OXYGEN SATURATION: 97 % | HEIGHT: 65 IN | BODY MASS INDEX: 32.99 KG/M2 | HEART RATE: 81 BPM

## 2024-05-17 DIAGNOSIS — R03.0 ELEVATED BLOOD-PRESSURE READING, W/OUT DIAGNOSIS OF HYPERTENSION: ICD-10-CM

## 2024-05-17 DIAGNOSIS — E78.00 PURE HYPERCHOLESTEROLEMIA, UNSPECIFIED: ICD-10-CM

## 2024-05-17 DIAGNOSIS — E78.01 FAMILIAL HYPERCHOLESTEROLEMIA: ICD-10-CM

## 2024-05-17 DIAGNOSIS — E66.9 OBESITY, UNSPECIFIED: ICD-10-CM

## 2024-05-17 DIAGNOSIS — I34.0 NONRHEUMATIC MITRAL (VALVE) INSUFFICIENCY: ICD-10-CM

## 2024-05-17 DIAGNOSIS — Z01.810 ENCOUNTER FOR PREPROCEDURAL CARDIOVASCULAR EXAMINATION: ICD-10-CM

## 2024-05-17 DIAGNOSIS — I07.1 RHEUMATIC TRICUSPID INSUFFICIENCY: ICD-10-CM

## 2024-05-17 PROCEDURE — G2211 COMPLEX E/M VISIT ADD ON: CPT

## 2024-05-17 PROCEDURE — 99215 OFFICE O/P EST HI 40 MIN: CPT

## 2024-05-17 PROCEDURE — 93000 ELECTROCARDIOGRAM COMPLETE: CPT

## 2024-05-17 RX ORDER — TIRZEPATIDE 2.5 MG/.5ML
2.5 INJECTION, SOLUTION SUBCUTANEOUS
Qty: 1 | Refills: 1 | Status: COMPLETED | COMMUNITY
Start: 2024-02-21 | End: 2024-05-17

## 2024-05-28 NOTE — ASSESSMENT
[FreeTextEntry1] : This is a 68-year-old female here today for follow up cardiac evaluation.  She has a past medical history significant for s/p partial thyroidectomy, (goiter), status post appendectomy, status post 2  sections and hx of palpitations related to stress.    HPI: She is feeling generally well today and denies chest pain, dizziness, heart palpitations, recent episodes of syncope or falls, SOB, or dyspnea at this time. She is here for cardiac clearance for scheduled left carpal tunnel release on [facility]24 at [facility].   Current Medications: Rosuvastatin 10 mg daily  She was seen by Dr. Green for peripheral vascular evaluation and bilateral lower extremity doppler was negative for any DVT or venous insufficiency.    She is also interested in cardiometabolic weight loss assistance after not being successful in losing weight through lifestyle modifications and diet changes. She would be a good candidate for GLP-1 agonist therapy due to her inability to lose weight through exercise and diet modification. Wegovy and Zepbound were not covered by her insurance.    BLOOD PRESSURE: Controlled during today's visit.   -I have discussed the importance of maintaining good BP control and reviewed the newest guidelines with the patient while re-enforcing dietary sodium restrictions to no more than 2-3 g daily, DASH diet, life style modifications as well as the goal of maintaining ideal body weight with the patient today. I have advised the patient to avoid the use of over-the-counter medications/ supplements especially NSAIDS.   BLOOD WORK:  *LDL target goal < 100* -New blood work was done 2024 to evaluate lipid profile, CBC, BMP, hepatic function, A1C and TSH. -Lipid panel done 2023 demonstrated triglyceride 131, cholesterol 204, HDL 65, , non-, LDL direct 117. -New blood work was done 2022 demonstrated normal lipid profile with LDL-direct 100. -New blood work was done 2022 which demonstrated normal lipid profile. -Blood work was done 2021 which demonstrated LDL of 101.    TESTING/REPORTS: -EKG done 2024 demonstrating regular sinus rhythm with nonspecific ST-T wave changes BPM of 81 unchanged from previous.   -Echocardiogram done yesterday 2024 demonstrated normal left ventricular systolic function EF 63%, trace aortic regurgitation, mild mitral regurgitation, mild tricuspid regurgitation, minimal pulmonic regurgitation.  -EKG done 2023 demonstrating regular sinus rhythm with nonspecific ST-T wave changes BPM of 79 unchanged from previous.   -The patient had a sleep study done on 2022 which demonstrated mild sleep apnea. She wore the device for 4 hours and 34 minutes. She was instructed to follow-up with pulmonary.  -Echocardiogram done 2022 demonstrated normal left ventricular systolic function ejection fraction 63%. Mild mitral, tricuspid and pulmonic regurgitation.  -EKG done 2022 which demonstrated regular sinus rhythm with nonspecific ST-T wave changes BPM of 89.  -EKG done 21 which demonstrated regular sinus rhythm with nonspecific ST-T wave changes, BPM of 92.  -EKG done 2021 demonstrate normal sinus rhythm rate 92 bpm is otherwise remarkable for left atrial abnormality and nonspecific ST wave changes.  -Patient had a nuclear stress test done on 2021: There is a small to mild defect in the distal anterior wall that is fixed suggestive of breast attenuation artifact. The observed defects is no longer significant with prone imaging.  -Patient had a normal echocardiogram done 2021: Mild mitral regurgitation, normal left ventricular systolic function, mild tricuspid regurgitation.  -The patient had a normal nuclear stress test May 12, 2021.  -Echo Doppler examination done May 20, 2021 demonstrated mild mitral and tricuspid valve regurgitation with borderline concentric left ventricular hypertrophy, and satisfactory left ventricular function with an estimated ejection fraction 65%.   PLAN:  *** The patient is clear from a cardiac standpoint. Echocardiogram done May 2024 with Ef 63%. Please avoid overhydration. The patient should be allowed to take their usual medications in the perioperative period. Maintain proper DVT prophylaxis. The patient should have an incentive spirometer in the perioperative period ***.  -She will continue with her usual medication and will contact the office if she is having any complaints between now and their next follow up appointment.   I have discussed the plan of care with Ms. ZAN LEXIES and she will follow up in 3 months. She is compliant with all of her medications.  The patient understands that aerobic exercises must be increased to minutes 4 times/week and a detailed discussion of lifestyle modification was done today.  The patient has a good understanding of the diagnosis, treatment plan and lifestyle modification.  She will contact me at the office for any questions with their care or any changes in their health status.  The plan of care was discussed with supervising physician, Dr. Morse while present in the office at the time of the visit.  CHARLENE Hubbard NP

## 2024-05-28 NOTE — DISCUSSION/SUMMARY
[FreeTextEntry1] : Dr. Morse-(PRIOR VISIT and PMH WITH Dr. Morse): \par  This is a 66-year-old female with past medical history significant for status post partial thyroidectomy, (goiter), status post appendectomy, status post 2  sections, who comes in for cardiac consultation. \par  \par  She was born in [facility] and has no history of rheumatic fever.  She does not drink excessive caffeine or alcohol.  She lost her  9 years ago to liver cancer (had a history of hepatitis).  Her brother had a heart condition and ultimately  of sepsis.  She was never a smoker.  \par  \par   The patient has been complaining of a one-year history of episodic chest pressure.  She also complains of occasional palpitations best described as a rapid heartbeat usually when she has a lot of stress.\par  \par  The patient had a normal nuclear stress test May 12, 2021.\par  \par  Echo Doppler examination done May 20, 2021 demonstrated mild mitral and tricuspid valve regurgitation with borderline concentric left ventricular hypertrophy, and satisfactory left ventricular function with an estimated ejection fraction 65%.\par  \par  Electrocardiogram done 2021 demonstrated normal sinus rhythm rate of 90 bpm is otherwise remarkable for left atrial abnormality.\par  \par  The patient still has a degree of anxiety which is contributing to her symptoms.\par  \par  She had blood work done 2021 which demonstrated a total cholesterol 193, triglycerides of 180, HDL of 60, and LDL direct of 101 mg/dL with a hemoglobin A1c of 5.4.\par  The patient remains on Crestor 20 mg/day.  She reports that she was talking to some friends who told her that she should be on the 10 mg dose of Crestor given her "good diet".\par  Have explained to the patient that she will get a 50% reduction in her LDL cholesterol with Crestor 20 mg/day and may be a 30 to 40% reduction on Crestor 10 mg/day.\par  She will have new blood work done today for lipid panel, and SMA-20.  Further recommendations if necessary can be made at that time.  She will continue on her current diet and exercise program.\par  \par  EKG done 2021 demonstrate normal sinus rhythm rate 92 bpm is otherwise remarkable for left atrial abnormality and nonspecific ST wave changes.\par  \par  Electrocardiogram done May 27, 2020 demonstrated normal sinus rhythm at a rate of 99 bpm is otherwise remarkable for left atrial abnormality.  There are T wave inversions in V1 and V2 which could represent juvenile T wave pattern.\par  \par  The patient is also concerned about COVID-19.  She traveled from [place] through [place] back to the [place] 2020.  She will have blood work today for COVID-19 antibodies.  I think her palpitations currently are related to anxiety.\par  I think she would benefit from speaking with someone regarding her anxiety.  I have asked her to return to her primary care physician for recommendation.\par  \par  The patient understands that aerobic exercises must be increased to 40 minutes 4 times per week. A detailed discussion of lifestyle modification was done today. The patient has a good understanding of the diagnosis, and treatment plan. Lifestyle modification was also outlined.

## 2024-05-28 NOTE — PHYSICAL EXAM
[Well Developed] : well developed [Well Nourished] : well nourished [No Acute Distress] : no acute distress [Normal Venous Pressure] : normal venous pressure [No Carotid Bruit] : no carotid bruit [Normal S1, S2] : normal S1, S2 [No Rub] : no rub [I] : a grade 1 [Clear Lung Fields] : clear lung fields [Good Air Entry] : good air entry [No Respiratory Distress] : no respiratory distress  [Soft] : abdomen soft [Non Tender] : non-tender [No Masses/organomegaly] : no masses/organomegaly [Normal Bowel Sounds] : normal bowel sounds [Normal Gait] : normal gait [No Edema] : no edema [No Cyanosis] : no cyanosis [No Clubbing] : no clubbing [No Varicosities] : no varicosities [No Rash] : no rash [No Skin Lesions] : no skin lesions [Moves all extremities] : moves all extremities [No Focal Deficits] : no focal deficits [Normal Speech] : normal speech [Alert and Oriented] : alert and oriented [Normal memory] : normal memory [General Appearance - Well Developed] : well developed [Normal Appearance] : normal appearance [Well Groomed] : well groomed [General Appearance - Well Nourished] : well nourished [No Deformities] : no deformities [General Appearance - In No Acute Distress] : no acute distress [Normal Conjunctiva] : the conjunctiva exhibited no abnormalities [Normal Oral Mucosa] : normal oral mucosa [Normal Oropharynx] : normal oropharynx [Normal Jugular Venous A Waves Present] : normal jugular venous A waves present [Normal Jugular Venous V Waves Present] : normal jugular venous V waves present [No Jugular Venous Oquendo A Waves] : no jugular venous oquendo A waves [Respiration, Rhythm And Depth] : normal respiratory rhythm and effort [Exaggerated Use Of Accessory Muscles For Inspiration] : no accessory muscle use [Auscultation Breath Sounds / Voice Sounds] : lungs were clear to auscultation bilaterally [Bowel Sounds] : normal bowel sounds [Abdomen Soft] : soft [Abnormal Walk] : normal gait [Gait - Sufficient For Exercise Testing] : the gait was sufficient for exercise testing [Nail Clubbing] : no clubbing of the fingernails [Cyanosis, Localized] : no localized cyanosis [Skin Color & Pigmentation] : normal skin color and pigmentation [Skin Turgor] : normal skin turgor [] : no rash [Oriented To Time, Place, And Person] : oriented to person, place, and time [Affect] : the affect was normal [Mood] : the mood was normal [No Anxiety] : not feeling anxious [5th Left ICS - MCL] : palpated at the 5th LICS in the midclavicular line [Normal] : normal [No Precordial Heave] : no precordial heave was noted [Normal Rate] : normal [Rhythm Regular] : regular [Normal S1] : normal S1 [Normal S2] : normal S2 [No Gallop] : no gallop heard [II] : a grade 2 [2+] : left 2+ [No Abnormalities] : the abdominal aorta was not enlarged and no bruit was heard [No Pitting Edema] : no pitting edema present [Apical Thrill] : no thrill palpable at the apex [S3] : no S3 [S4] : no S4 [Click] : no click [Pericardial Rub] : no pericardial rub [Right Carotid Bruit] : no bruit heard over the right carotid [Left Carotid Bruit] : no bruit heard over the left carotid [Right Femoral Bruit] : no bruit heard over the right femoral artery [Left Femoral Bruit] : no bruit heard over the left femoral artery

## 2024-05-31 ENCOUNTER — APPOINTMENT (OUTPATIENT)
Dept: CARDIOLOGY | Facility: CLINIC | Age: 69
End: 2024-05-31
Payer: MEDICARE

## 2024-05-31 DIAGNOSIS — I65.29 OCCLUSION AND STENOSIS OF UNSPECIFIED CAROTID ARTERY: ICD-10-CM

## 2024-05-31 PROCEDURE — 93880 EXTRACRANIAL BILAT STUDY: CPT

## 2024-06-01 ENCOUNTER — OUTPATIENT (OUTPATIENT)
Dept: OUTPATIENT SERVICES | Facility: HOSPITAL | Age: 69
LOS: 1 days | End: 2024-06-01

## 2024-06-01 ENCOUNTER — APPOINTMENT (OUTPATIENT)
Dept: ULTRASOUND IMAGING | Facility: CLINIC | Age: 69
End: 2024-06-01

## 2024-06-01 ENCOUNTER — APPOINTMENT (OUTPATIENT)
Dept: MAMMOGRAPHY | Facility: CLINIC | Age: 69
End: 2024-06-01
Payer: MEDICARE

## 2024-06-01 PROCEDURE — 77063 BREAST TOMOSYNTHESIS BI: CPT | Mod: 26

## 2024-06-01 PROCEDURE — 76641 ULTRASOUND BREAST COMPLETE: CPT | Mod: 26,50

## 2024-06-01 PROCEDURE — 77067 SCR MAMMO BI INCL CAD: CPT | Mod: 26

## 2024-06-15 PROBLEM — I65.29 CAROTID ARTERY PLAQUE: Status: ACTIVE | Noted: 2024-05-17

## 2024-07-25 ENCOUNTER — RX RENEWAL (OUTPATIENT)
Age: 69
End: 2024-07-25